# Patient Record
Sex: FEMALE | Race: BLACK OR AFRICAN AMERICAN | Employment: FULL TIME | URBAN - METROPOLITAN AREA
[De-identification: names, ages, dates, MRNs, and addresses within clinical notes are randomized per-mention and may not be internally consistent; named-entity substitution may affect disease eponyms.]

---

## 2018-06-22 ENCOUNTER — HOSPITAL ENCOUNTER (OUTPATIENT)
Dept: PREADMISSION TESTING | Age: 33
Discharge: HOME | End: 2018-06-22
Payer: COMMERCIAL

## 2018-06-22 VITALS
BODY MASS INDEX: 26.95 KG/M2 | DIASTOLIC BLOOD PRESSURE: 82 MMHG | HEART RATE: 80 BPM | WEIGHT: 188.25 LBS | SYSTOLIC BLOOD PRESSURE: 124 MMHG | TEMPERATURE: 98 F | RESPIRATION RATE: 20 BRPM | HEIGHT: 70 IN

## 2018-06-22 LAB
BASOPHILS # BLD: 0 K/UL (ref 0–0.1)
BASOPHILS NFR BLD: 0 % (ref 0–1)
DIFFERENTIAL METHOD BLD: ABNORMAL
EOSINOPHIL # BLD: 0 K/UL (ref 0–0.4)
EOSINOPHIL NFR BLD: 0 % (ref 0–7)
ERYTHROCYTE [DISTWIDTH] IN BLOOD BY AUTOMATED COUNT: 17.3 % (ref 11.5–14.5)
HCG UR QL: NEGATIVE
HCT VFR BLD AUTO: 34.2 % (ref 35–47)
HGB BLD-MCNC: 10.5 G/DL (ref 11.5–16)
IMM GRANULOCYTES # BLD: 0 K/UL (ref 0–0.04)
IMM GRANULOCYTES NFR BLD AUTO: 0 % (ref 0–0.5)
LYMPHOCYTES # BLD: 0.9 K/UL (ref 0.8–3.5)
LYMPHOCYTES NFR BLD: 25 % (ref 12–49)
MCH RBC QN AUTO: 27.4 PG (ref 26–34)
MCHC RBC AUTO-ENTMCNC: 30.7 G/DL (ref 30–36.5)
MCV RBC AUTO: 89.3 FL (ref 80–99)
MONOCYTES # BLD: 0.4 K/UL (ref 0–1)
MONOCYTES NFR BLD: 13 % (ref 5–13)
NEUTS SEG # BLD: 2.2 K/UL (ref 1.8–8)
NEUTS SEG NFR BLD: 62 % (ref 32–75)
NRBC # BLD: 0 K/UL (ref 0–0.01)
NRBC BLD-RTO: 0 PER 100 WBC
PLATELET # BLD AUTO: 236 K/UL (ref 150–400)
PMV BLD AUTO: 12.5 FL (ref 8.9–12.9)
RBC # BLD AUTO: 3.83 M/UL (ref 3.8–5.2)
WBC # BLD AUTO: 3.5 K/UL (ref 3.6–11)

## 2018-06-22 PROCEDURE — 86900 BLOOD TYPING SEROLOGIC ABO: CPT | Performed by: OBSTETRICS & GYNECOLOGY

## 2018-06-22 PROCEDURE — 36415 COLL VENOUS BLD VENIPUNCTURE: CPT | Performed by: OBSTETRICS & GYNECOLOGY

## 2018-06-22 PROCEDURE — 86923 COMPATIBILITY TEST ELECTRIC: CPT | Performed by: OBSTETRICS & GYNECOLOGY

## 2018-06-22 PROCEDURE — 81025 URINE PREGNANCY TEST: CPT | Performed by: OBSTETRICS & GYNECOLOGY

## 2018-06-22 PROCEDURE — 85025 COMPLETE CBC W/AUTO DIFF WBC: CPT | Performed by: OBSTETRICS & GYNECOLOGY

## 2018-06-22 RX ORDER — NORETHINDRONE ACETATE AND ETHINYL ESTRADIOL 1; .02 MG/1; MG/1
TABLET ORAL DAILY
COMMUNITY
End: 2018-07-03

## 2018-06-22 RX ORDER — LANOLIN ALCOHOL/MO/W.PET/CERES
CREAM (GRAM) TOPICAL DAILY
COMMUNITY

## 2018-06-22 RX ORDER — IBUPROFEN 200 MG
800 TABLET ORAL AS NEEDED
COMMUNITY
End: 2018-06-30

## 2018-06-22 RX ORDER — BISMUTH SUBSALICYLATE 262 MG
1 TABLET,CHEWABLE ORAL
COMMUNITY

## 2018-06-27 ENCOUNTER — ANESTHESIA EVENT (OUTPATIENT)
Dept: SURGERY | Age: 33
DRG: 743 | End: 2018-06-27
Payer: COMMERCIAL

## 2018-06-27 NOTE — H&P
Visit Type:  Pre-Op    CC:  Fibroids. History of Present Illness:  27 yo presents for a pre op visit for a Abdominal myomectomy on 18. Pt is currently on her OCP she is planning to DC today. She is taking her Fe still. Vital Signs   26Years Old Female  Height:  71 inches  Weight: 188. 2 pounds  BMI:      26.34  BSA:      2.06  BP:       110/80    Past Pregnancy History   : 0  Para:     0  Aborta:  0  Term: 0, Premature: 0, Living Children: 0, Vaginal Deliveries: 0, C-Sections: 0, Elect. Ab: 0, Ectopics: 0    Gynecologic History   Last Menstrual Period: 06/10/2018  Does patient have any problems with urine leakage? no  Does patient have any other bladder problems? no  History of abnormal pap: no  Gardasil Injection History: Complete  Pt currently sexually active: no  Pt ever sexually active: yes  Current Contraception: Oral Contraception. Junel FE  History of STD: yes   STD Type: HPV.       Allergies    LATEX (DISPOSABLE GLOVES) (Mild)      Medications Removed from Medication List          Past Medical History:     Reviewed history from 2018 and no changes required:        Fibroids -     Past Surgical History:     Reviewed history from 2018 and no changes required:        [facility] Teeth    Family History Summary:      Reviewed history Last on 2018 and no changes required:2018  Uncle - Has Family History of Diabetes - Entered On: 3/26/2018  Uncle - Has Family History of Heart Disease - Entered On: 3/26/2018  Uncle - Has Family History of Hypertension - Entered On: 3/26/2018  MGM - Has Family History of Diabetes - Entered On: 3/26/2018    General Comments - FH:  and dementia    Social History:     Reviewed history from 2018 and no changes required:        [address]        Works for [employer]      Risk Factors:     Smoked Tobacco Use:  Never smoker  Smokeless Tobacco Use:  Never  Drug use:  yes     Substance:  marijuana  HIV high-risk behavior:  no  Caffeine use:  0 drinks per day  Alcohol use:  yes     Type:  socially     Drinks per day:  <1  Exercise:  yes     Times per week:  3-4     Type of Exercise:  cardio, strength training, yoga  Seatbelt use:  100 %  Sun Exposure:  frequently    Family History Risk Factors:     Family History of MI in females < 72years old:  no     Family History of MI in males < 54years old:  no    Previous Tobacco Use: Signed On 2018  Smoked Tobacco Use:  Never smoker  Smokeless Tobacco Use:  Never  Drug use:  yes     Substance:  marijuana  HIV high-risk behavior:  no  Caffeine use:  0 drinks per day    Previous Alcohol Use: Signed On 2018  Alcohol use:  yes     Type:  socially     Drinks per day:  <1  Exercise:  yes     Times per week:  3-4     Type of Exercise:  cardio, strength training, yoga  Seatbelt use:  100 %  Sun Exposure:  frequently    Family History Risk Factors:     Family History of MI in females < 72years old:  no     Family History of MI in males < 54years old:  no      Past Pregnancy History      :  0     Term Births:  0     Premature Births: 0     Living Children: 0     Para:   0     Prev : 0     Aborta:  0     Elect. Ab:  0     Spont. Ab:  0     Ectopics:  0      Review of Systems        See HPI    Except as noted in the HPI, the review of systems is negative for General, Breast, , Resp, GI, Endo, MS, Psych and Heme. General Medical Physical Exam:     General Appearance:       well developed, well nourished, in no acute distress    Head:        Inspection:  normocephalic without obvious abnormalities    Eyes:        External:  EOM intact    Ears, Nose, Throat:        External:  normocephalic and atraumatic       Hearing:  grossly intact    Respiratory:        Resp. effort:  no use of accessory muscles    Cardiovascular:       Peripheral circ: no cyanosis, clubbing, or edema    Gastrointestinal:        Abdomen:  Fibroid uterus palpable above umbilicus       Liver/spleen:   no enlargement or nodularity       Hernia:  no hernias    Genitourinary:        Ext. genitalia: normal appearance; no lesions or discharge       Urethra:  no discharge       Bladder:  no cystocele       Vagina:  distorted due to presence of large fibroid uterus       Cervix:  displaced anteriorly due to large fibroids       Uterus:  20-24 week size       Adnexa:  no masses or tenderness    Musculoskeletal:        Gait/station:  normal gait    Lymphatic:        Axilla:  no axillary adenopathy       Inguinal:  no inguinal adenopathy    Skin:        Inspection:  no rashes, suspicious lesions, or ulcerations    Neurological:        Other:  grossly intact    Psychiatric:       Orientation:  oriented to time, place, and person              Impression & Recommendations:    Problem # 1:  Leiomyoma (ICD-218.9) (YGA63-Q29.9)  Reviewed plans for open abdominal myomectomy. Procedure reviewed with patient. Reviewed 1-2 days in hospital postop. Reviewed plans for probable Pfannenstiel incision but possibility of a vertical skin incision. Reviewed risks of any operation  including but not limited to infection, injury to other organs including but not limited to bladder and bowel, blood loss and potential need for transfusion, DVT/PE and postoperative incisional complications including wound separation. Also reviewed were specific complications regarding myomectomies including possible entry into the uterine cavity, scarring, damage to fallopian tubes, uture infertility as well as the possible need for a hysterectomy. All patient questions were answered, she agrees with plan of care and Krysten/Tammie Godinez consent was signed.     Orders:  Pre-Op Exam (CPT-PO)  Pre-Op Exam (CPT-PO)      Medications (at conclusion of this visit)    06/22/2018 IRON TABLET (FERROUS GLUCONATE TABS) Prescribed by non 606/Tammie Godinez MD  03/26/2018 JUNEL FE 24 1-20 MG-MCG(24) ORAL TABLET (Maranda Deal ESTRAD-FE) Prescribed by sg Bashir/Tammie Godinez MD          ________________________________________________________________________      Date of Surgery Update:  Jb Gottlieb was seen and examined. History and physical has been reviewed. The patient has been examined.  There have been no significant clinical changes since the completion of the originally dated History and Physical.    Signed By: Jordana Santo MD     June 28, 2018 7:29 AM

## 2018-06-28 ENCOUNTER — ANESTHESIA (OUTPATIENT)
Dept: SURGERY | Age: 33
DRG: 743 | End: 2018-06-28
Payer: COMMERCIAL

## 2018-06-28 ENCOUNTER — HOSPITAL ENCOUNTER (INPATIENT)
Age: 33
LOS: 2 days | Discharge: HOME | DRG: 743 | End: 2018-06-30
Attending: OBSTETRICS & GYNECOLOGY | Admitting: OBSTETRICS & GYNECOLOGY
Payer: COMMERCIAL

## 2018-06-28 DIAGNOSIS — D25.2 INTRAMURAL AND SUBSEROUS LEIOMYOMA OF UTERUS: Primary | ICD-10-CM

## 2018-06-28 DIAGNOSIS — D25.1 INTRAMURAL AND SUBSEROUS LEIOMYOMA OF UTERUS: Primary | ICD-10-CM

## 2018-06-28 PROBLEM — D21.9 FIBROID: Status: ACTIVE | Noted: 2018-06-28

## 2018-06-28 LAB
ERYTHROCYTE [DISTWIDTH] IN BLOOD BY AUTOMATED COUNT: 17.2 % (ref 11.5–14.5)
HCG UR QL: NEGATIVE
HCT VFR BLD AUTO: 28.6 % (ref 35–47)
HGB BLD-MCNC: 9.1 G/DL (ref 11.5–16)
MCH RBC QN AUTO: 28.7 PG (ref 26–34)
MCHC RBC AUTO-ENTMCNC: 31.8 G/DL (ref 30–36.5)
MCV RBC AUTO: 90.2 FL (ref 80–99)
NRBC # BLD: 0 K/UL (ref 0–0.01)
NRBC BLD-RTO: 0 PER 100 WBC
PLATELET # BLD AUTO: 177 K/UL (ref 150–400)
PMV BLD AUTO: 11.5 FL (ref 8.9–12.9)
RBC # BLD AUTO: 3.17 M/UL (ref 3.8–5.2)
WBC # BLD AUTO: 15.6 K/UL (ref 3.6–11)

## 2018-06-28 PROCEDURE — 77030018836 HC SOL IRR NACL ICUM -A: Performed by: OBSTETRICS & GYNECOLOGY

## 2018-06-28 PROCEDURE — 88305 TISSUE EXAM BY PATHOLOGIST: CPT | Performed by: OBSTETRICS & GYNECOLOGY

## 2018-06-28 PROCEDURE — 85027 COMPLETE CBC AUTOMATED: CPT | Performed by: OBSTETRICS & GYNECOLOGY

## 2018-06-28 PROCEDURE — 77030018390 HC SPNG HEMSTAT2 J&J -B: Performed by: OBSTETRICS & GYNECOLOGY

## 2018-06-28 PROCEDURE — 74011250636 HC RX REV CODE- 250/636: Performed by: ANESTHESIOLOGY

## 2018-06-28 PROCEDURE — 77030002933 HC SUT MCRYL J&J -A: Performed by: OBSTETRICS & GYNECOLOGY

## 2018-06-28 PROCEDURE — 74011250636 HC RX REV CODE- 250/636

## 2018-06-28 PROCEDURE — 76010000137 HC OR TIME 5 TO 5.5 HR: Performed by: OBSTETRICS & GYNECOLOGY

## 2018-06-28 PROCEDURE — 0UB90ZZ EXCISION OF UTERUS, OPEN APPROACH: ICD-10-PCS | Performed by: OBSTETRICS & GYNECOLOGY

## 2018-06-28 PROCEDURE — 77030026438 HC STYL ET INTUB CARD -A: Performed by: ANESTHESIOLOGY

## 2018-06-28 PROCEDURE — 77030013079 HC BLNKT BAIR HGGR 3M -A: Performed by: ANESTHESIOLOGY

## 2018-06-28 PROCEDURE — 77030039266 HC ADH SKN EXOFIN S2SG -A: Performed by: OBSTETRICS & GYNECOLOGY

## 2018-06-28 PROCEDURE — P9016 RBC LEUKOCYTES REDUCED: HCPCS | Performed by: OBSTETRICS & GYNECOLOGY

## 2018-06-28 PROCEDURE — 85018 HEMOGLOBIN: CPT

## 2018-06-28 PROCEDURE — 74011250636 HC RX REV CODE- 250/636: Performed by: OBSTETRICS & GYNECOLOGY

## 2018-06-28 PROCEDURE — 77030031139 HC SUT VCRL2 J&J -A: Performed by: OBSTETRICS & GYNECOLOGY

## 2018-06-28 PROCEDURE — 76210000017 HC OR PH I REC 1.5 TO 2 HR: Performed by: OBSTETRICS & GYNECOLOGY

## 2018-06-28 PROCEDURE — 77030005518 HC CATH URETH FOL 2W BARD -B: Performed by: OBSTETRICS & GYNECOLOGY

## 2018-06-28 PROCEDURE — 77030011640 HC PAD GRND REM COVD -A: Performed by: OBSTETRICS & GYNECOLOGY

## 2018-06-28 PROCEDURE — 81025 URINE PREGNANCY TEST: CPT

## 2018-06-28 PROCEDURE — 74011000250 HC RX REV CODE- 250

## 2018-06-28 PROCEDURE — 74011000258 HC RX REV CODE- 258

## 2018-06-28 PROCEDURE — 74011000258 HC RX REV CODE- 258: Performed by: OBSTETRICS & GYNECOLOGY

## 2018-06-28 PROCEDURE — 77030008684 HC TU ET CUF COVD -B: Performed by: ANESTHESIOLOGY

## 2018-06-28 PROCEDURE — C1765 ADHESION BARRIER: HCPCS | Performed by: OBSTETRICS & GYNECOLOGY

## 2018-06-28 PROCEDURE — 36415 COLL VENOUS BLD VENIPUNCTURE: CPT | Performed by: OBSTETRICS & GYNECOLOGY

## 2018-06-28 PROCEDURE — 77030018846 HC SOL IRR STRL H20 ICUM -A: Performed by: OBSTETRICS & GYNECOLOGY

## 2018-06-28 PROCEDURE — 76060000041 HC ANESTHESIA 5 TO 5.5 HR: Performed by: OBSTETRICS & GYNECOLOGY

## 2018-06-28 PROCEDURE — 65410000002 HC RM PRIVATE OB

## 2018-06-28 PROCEDURE — P9045 ALBUMIN (HUMAN), 5%, 250 ML: HCPCS

## 2018-06-28 PROCEDURE — 77030032490 HC SLV COMPR SCD KNE COVD -B: Performed by: OBSTETRICS & GYNECOLOGY

## 2018-06-28 PROCEDURE — 74011000250 HC RX REV CODE- 250: Performed by: NURSE ANESTHETIST, CERTIFIED REGISTERED

## 2018-06-28 RX ORDER — ONDANSETRON 2 MG/ML
4 INJECTION INTRAMUSCULAR; INTRAVENOUS
Status: DISCONTINUED | OUTPATIENT
Start: 2018-06-28 | End: 2018-06-30 | Stop reason: HOSPADM

## 2018-06-28 RX ORDER — ONDANSETRON 2 MG/ML
4 INJECTION INTRAMUSCULAR; INTRAVENOUS AS NEEDED
Status: DISCONTINUED | OUTPATIENT
Start: 2018-06-28 | End: 2018-06-28 | Stop reason: HOSPADM

## 2018-06-28 RX ORDER — SODIUM CHLORIDE 0.9 % (FLUSH) 0.9 %
5-10 SYRINGE (ML) INJECTION EVERY 8 HOURS
Status: DISCONTINUED | OUTPATIENT
Start: 2018-06-28 | End: 2018-06-28 | Stop reason: HOSPADM

## 2018-06-28 RX ORDER — SODIUM CHLORIDE 0.9 % (FLUSH) 0.9 %
5-10 SYRINGE (ML) INJECTION EVERY 8 HOURS
Status: DISCONTINUED | OUTPATIENT
Start: 2018-06-28 | End: 2018-06-30 | Stop reason: HOSPADM

## 2018-06-28 RX ORDER — MIDAZOLAM HYDROCHLORIDE 1 MG/ML
1 INJECTION, SOLUTION INTRAMUSCULAR; INTRAVENOUS AS NEEDED
Status: DISCONTINUED | OUTPATIENT
Start: 2018-06-28 | End: 2018-06-28 | Stop reason: HOSPADM

## 2018-06-28 RX ORDER — SODIUM CHLORIDE 9 MG/ML
250 INJECTION, SOLUTION INTRAVENOUS AS NEEDED
Status: DISCONTINUED | OUTPATIENT
Start: 2018-06-28 | End: 2018-06-29

## 2018-06-28 RX ORDER — SODIUM CHLORIDE 9 MG/ML
INJECTION, SOLUTION INTRAVENOUS
Status: DISCONTINUED | OUTPATIENT
Start: 2018-06-28 | End: 2018-06-28 | Stop reason: HOSPADM

## 2018-06-28 RX ORDER — FENTANYL CITRATE 50 UG/ML
25 INJECTION, SOLUTION INTRAMUSCULAR; INTRAVENOUS
Status: COMPLETED | OUTPATIENT
Start: 2018-06-28 | End: 2018-06-28

## 2018-06-28 RX ORDER — ONDANSETRON 2 MG/ML
INJECTION INTRAMUSCULAR; INTRAVENOUS AS NEEDED
Status: DISCONTINUED | OUTPATIENT
Start: 2018-06-28 | End: 2018-06-28 | Stop reason: HOSPADM

## 2018-06-28 RX ORDER — MORPHINE SULFATE 10 MG/ML
2 INJECTION, SOLUTION INTRAMUSCULAR; INTRAVENOUS
Status: DISCONTINUED | OUTPATIENT
Start: 2018-06-28 | End: 2018-06-28 | Stop reason: HOSPADM

## 2018-06-28 RX ORDER — SODIUM CHLORIDE 9 MG/ML
1000 INJECTION, SOLUTION INTRAVENOUS CONTINUOUS
Status: DISCONTINUED | OUTPATIENT
Start: 2018-06-28 | End: 2018-06-28 | Stop reason: HOSPADM

## 2018-06-28 RX ORDER — CEFAZOLIN SODIUM/WATER 2 G/20 ML
2 SYRINGE (ML) INTRAVENOUS
Status: COMPLETED | OUTPATIENT
Start: 2018-06-28 | End: 2018-06-28

## 2018-06-28 RX ORDER — OXYCODONE AND ACETAMINOPHEN 5; 325 MG/1; MG/1
1 TABLET ORAL AS NEEDED
Status: DISCONTINUED | OUTPATIENT
Start: 2018-06-28 | End: 2018-06-28 | Stop reason: HOSPADM

## 2018-06-28 RX ORDER — PROPOFOL 10 MG/ML
INJECTION, EMULSION INTRAVENOUS AS NEEDED
Status: DISCONTINUED | OUTPATIENT
Start: 2018-06-28 | End: 2018-06-28 | Stop reason: HOSPADM

## 2018-06-28 RX ORDER — DOCUSATE SODIUM 100 MG/1
100 CAPSULE, LIQUID FILLED ORAL
Status: DISCONTINUED | OUTPATIENT
Start: 2018-06-28 | End: 2018-06-30 | Stop reason: HOSPADM

## 2018-06-28 RX ORDER — DEXAMETHASONE SODIUM PHOSPHATE 4 MG/ML
INJECTION, SOLUTION INTRA-ARTICULAR; INTRALESIONAL; INTRAMUSCULAR; INTRAVENOUS; SOFT TISSUE AS NEEDED
Status: DISCONTINUED | OUTPATIENT
Start: 2018-06-28 | End: 2018-06-28 | Stop reason: HOSPADM

## 2018-06-28 RX ORDER — MIDAZOLAM HYDROCHLORIDE 1 MG/ML
0.5 INJECTION, SOLUTION INTRAMUSCULAR; INTRAVENOUS
Status: DISCONTINUED | OUTPATIENT
Start: 2018-06-28 | End: 2018-06-28 | Stop reason: HOSPADM

## 2018-06-28 RX ORDER — SODIUM CHLORIDE 0.9 % (FLUSH) 0.9 %
5-10 SYRINGE (ML) INJECTION AS NEEDED
Status: DISCONTINUED | OUTPATIENT
Start: 2018-06-28 | End: 2018-06-28 | Stop reason: HOSPADM

## 2018-06-28 RX ORDER — SUCCINYLCHOLINE CHLORIDE 20 MG/ML
INJECTION INTRAMUSCULAR; INTRAVENOUS AS NEEDED
Status: DISCONTINUED | OUTPATIENT
Start: 2018-06-28 | End: 2018-06-28 | Stop reason: HOSPADM

## 2018-06-28 RX ORDER — HYDROMORPHONE HYDROCHLORIDE 2 MG/ML
INJECTION, SOLUTION INTRAMUSCULAR; INTRAVENOUS; SUBCUTANEOUS AS NEEDED
Status: DISCONTINUED | OUTPATIENT
Start: 2018-06-28 | End: 2018-06-28 | Stop reason: HOSPADM

## 2018-06-28 RX ORDER — SODIUM CHLORIDE, SODIUM LACTATE, POTASSIUM CHLORIDE, CALCIUM CHLORIDE 600; 310; 30; 20 MG/100ML; MG/100ML; MG/100ML; MG/100ML
125 INJECTION, SOLUTION INTRAVENOUS CONTINUOUS
Status: DISCONTINUED | OUTPATIENT
Start: 2018-06-28 | End: 2018-06-28 | Stop reason: HOSPADM

## 2018-06-28 RX ORDER — FENTANYL CITRATE 50 UG/ML
50 INJECTION, SOLUTION INTRAMUSCULAR; INTRAVENOUS AS NEEDED
Status: DISCONTINUED | OUTPATIENT
Start: 2018-06-28 | End: 2018-06-28 | Stop reason: HOSPADM

## 2018-06-28 RX ORDER — KETOROLAC TROMETHAMINE 30 MG/ML
30 INJECTION, SOLUTION INTRAMUSCULAR; INTRAVENOUS
Status: DISCONTINUED | OUTPATIENT
Start: 2018-06-28 | End: 2018-06-29

## 2018-06-28 RX ORDER — LIDOCAINE HYDROCHLORIDE 20 MG/ML
INJECTION, SOLUTION EPIDURAL; INFILTRATION; INTRACAUDAL; PERINEURAL AS NEEDED
Status: DISCONTINUED | OUTPATIENT
Start: 2018-06-28 | End: 2018-06-28 | Stop reason: HOSPADM

## 2018-06-28 RX ORDER — FENTANYL CITRATE 50 UG/ML
INJECTION, SOLUTION INTRAMUSCULAR; INTRAVENOUS
Status: COMPLETED
Start: 2018-06-28 | End: 2018-06-28

## 2018-06-28 RX ORDER — ROCURONIUM BROMIDE 10 MG/ML
INJECTION, SOLUTION INTRAVENOUS AS NEEDED
Status: DISCONTINUED | OUTPATIENT
Start: 2018-06-28 | End: 2018-06-28 | Stop reason: HOSPADM

## 2018-06-28 RX ORDER — FENTANYL CITRATE 50 UG/ML
INJECTION, SOLUTION INTRAMUSCULAR; INTRAVENOUS AS NEEDED
Status: DISCONTINUED | OUTPATIENT
Start: 2018-06-28 | End: 2018-06-28 | Stop reason: HOSPADM

## 2018-06-28 RX ORDER — LIDOCAINE HYDROCHLORIDE 10 MG/ML
0.1 INJECTION, SOLUTION EPIDURAL; INFILTRATION; INTRACAUDAL; PERINEURAL AS NEEDED
Status: DISCONTINUED | OUTPATIENT
Start: 2018-06-28 | End: 2018-06-28 | Stop reason: HOSPADM

## 2018-06-28 RX ORDER — SODIUM CHLORIDE 9 MG/ML
50 INJECTION, SOLUTION INTRAVENOUS CONTINUOUS
Status: DISCONTINUED | OUTPATIENT
Start: 2018-06-28 | End: 2018-06-29

## 2018-06-28 RX ORDER — DIPHENHYDRAMINE HYDROCHLORIDE 50 MG/ML
12.5 INJECTION, SOLUTION INTRAMUSCULAR; INTRAVENOUS AS NEEDED
Status: DISCONTINUED | OUTPATIENT
Start: 2018-06-28 | End: 2018-06-28 | Stop reason: HOSPADM

## 2018-06-28 RX ORDER — MIDAZOLAM HYDROCHLORIDE 1 MG/ML
INJECTION, SOLUTION INTRAMUSCULAR; INTRAVENOUS AS NEEDED
Status: DISCONTINUED | OUTPATIENT
Start: 2018-06-28 | End: 2018-06-28 | Stop reason: HOSPADM

## 2018-06-28 RX ORDER — SODIUM CHLORIDE 0.9 % (FLUSH) 0.9 %
5-10 SYRINGE (ML) INJECTION AS NEEDED
Status: DISCONTINUED | OUTPATIENT
Start: 2018-06-28 | End: 2018-06-30 | Stop reason: HOSPADM

## 2018-06-28 RX ORDER — DIPHENHYDRAMINE HYDROCHLORIDE 50 MG/ML
12.5 INJECTION, SOLUTION INTRAMUSCULAR; INTRAVENOUS
Status: DISCONTINUED | OUTPATIENT
Start: 2018-06-28 | End: 2018-06-30 | Stop reason: HOSPADM

## 2018-06-28 RX ORDER — PHENYLEPHRINE HCL IN 0.9% NACL 0.4MG/10ML
SYRINGE (ML) INTRAVENOUS AS NEEDED
Status: DISCONTINUED | OUTPATIENT
Start: 2018-06-28 | End: 2018-06-28 | Stop reason: HOSPADM

## 2018-06-28 RX ORDER — HYDROMORPHONE HCL/0.9% NACL/PF 0.5 MG/ML
PLASTIC BAG, INJECTION (ML) INTRAVENOUS CONTINUOUS
Status: DISCONTINUED | OUTPATIENT
Start: 2018-06-28 | End: 2018-06-29

## 2018-06-28 RX ORDER — ALBUMIN HUMAN 50 G/1000ML
SOLUTION INTRAVENOUS AS NEEDED
Status: DISCONTINUED | OUTPATIENT
Start: 2018-06-28 | End: 2018-06-28 | Stop reason: HOSPADM

## 2018-06-28 RX ADMIN — Medication 2 G: at 11:44

## 2018-06-28 RX ADMIN — MORPHINE SULFATE 2 MG: 10 INJECTION INTRAVENOUS at 13:33

## 2018-06-28 RX ADMIN — MORPHINE SULFATE 2 MG: 10 INJECTION INTRAVENOUS at 13:55

## 2018-06-28 RX ADMIN — MORPHINE SULFATE 2 MG: 10 INJECTION INTRAVENOUS at 13:38

## 2018-06-28 RX ADMIN — HYDROMORPHONE HYDROCHLORIDE 0.5 MG: 2 INJECTION, SOLUTION INTRAMUSCULAR; INTRAVENOUS; SUBCUTANEOUS at 10:18

## 2018-06-28 RX ADMIN — FENTANYL CITRATE 100 MCG: 50 INJECTION, SOLUTION INTRAMUSCULAR; INTRAVENOUS at 07:39

## 2018-06-28 RX ADMIN — ROCURONIUM BROMIDE 20 MG: 10 INJECTION, SOLUTION INTRAVENOUS at 08:10

## 2018-06-28 RX ADMIN — MIDAZOLAM 0.5 MG: 1 INJECTION INTRAMUSCULAR; INTRAVENOUS at 13:30

## 2018-06-28 RX ADMIN — FENTANYL CITRATE 25 MCG: 50 INJECTION, SOLUTION INTRAMUSCULAR; INTRAVENOUS at 13:16

## 2018-06-28 RX ADMIN — MORPHINE SULFATE 2 MG: 10 INJECTION INTRAVENOUS at 13:50

## 2018-06-28 RX ADMIN — ROCURONIUM BROMIDE 10 MG: 10 INJECTION, SOLUTION INTRAVENOUS at 08:36

## 2018-06-28 RX ADMIN — MORPHINE SULFATE 2 MG: 10 INJECTION INTRAVENOUS at 14:25

## 2018-06-28 RX ADMIN — Medication: at 23:59

## 2018-06-28 RX ADMIN — SODIUM CHLORIDE: 9 INJECTION, SOLUTION INTRAVENOUS at 11:15

## 2018-06-28 RX ADMIN — ROCURONIUM BROMIDE 5 MG: 10 INJECTION, SOLUTION INTRAVENOUS at 07:39

## 2018-06-28 RX ADMIN — ONDANSETRON 4 MG: 2 INJECTION INTRAMUSCULAR; INTRAVENOUS at 12:24

## 2018-06-28 RX ADMIN — HYDROMORPHONE HYDROCHLORIDE 0.5 MG: 2 INJECTION, SOLUTION INTRAMUSCULAR; INTRAVENOUS; SUBCUTANEOUS at 10:50

## 2018-06-28 RX ADMIN — Medication 80 MCG: at 11:40

## 2018-06-28 RX ADMIN — FENTANYL CITRATE 50 MCG: 50 INJECTION, SOLUTION INTRAMUSCULAR; INTRAVENOUS at 09:42

## 2018-06-28 RX ADMIN — ROCURONIUM BROMIDE 30 MG: 10 INJECTION, SOLUTION INTRAVENOUS at 11:44

## 2018-06-28 RX ADMIN — Medication 80 MCG: at 11:24

## 2018-06-28 RX ADMIN — SUGAMMADEX 300 MG: 100 INJECTION, SOLUTION INTRAVENOUS at 12:24

## 2018-06-28 RX ADMIN — ALBUMIN HUMAN 250 ML: 50 SOLUTION INTRAVENOUS at 09:10

## 2018-06-28 RX ADMIN — DEXAMETHASONE SODIUM PHOSPHATE 8 MG: 4 INJECTION, SOLUTION INTRA-ARTICULAR; INTRALESIONAL; INTRAMUSCULAR; INTRAVENOUS; SOFT TISSUE at 07:44

## 2018-06-28 RX ADMIN — SODIUM CHLORIDE, POTASSIUM CHLORIDE, SODIUM LACTATE AND CALCIUM CHLORIDE: 600; 310; 30; 20 INJECTION, SOLUTION INTRAVENOUS at 10:55

## 2018-06-28 RX ADMIN — ROCURONIUM BROMIDE 20 MG: 10 INJECTION, SOLUTION INTRAVENOUS at 11:17

## 2018-06-28 RX ADMIN — HYDROMORPHONE HYDROCHLORIDE 0.5 MG: 2 INJECTION, SOLUTION INTRAMUSCULAR; INTRAVENOUS; SUBCUTANEOUS at 08:01

## 2018-06-28 RX ADMIN — FENTANYL CITRATE 25 MCG: 50 INJECTION, SOLUTION INTRAMUSCULAR; INTRAVENOUS at 13:01

## 2018-06-28 RX ADMIN — Medication 80 MCG: at 10:48

## 2018-06-28 RX ADMIN — Medication 80 MCG: at 11:09

## 2018-06-28 RX ADMIN — ALBUMIN HUMAN 250 ML: 50 SOLUTION INTRAVENOUS at 09:56

## 2018-06-28 RX ADMIN — MORPHINE SULFATE 2 MG: 10 INJECTION INTRAVENOUS at 14:00

## 2018-06-28 RX ADMIN — ROCURONIUM BROMIDE 10 MG: 10 INJECTION, SOLUTION INTRAVENOUS at 08:12

## 2018-06-28 RX ADMIN — PROPOFOL 30 MG: 10 INJECTION, EMULSION INTRAVENOUS at 07:58

## 2018-06-28 RX ADMIN — FENTANYL CITRATE 25 MCG: 50 INJECTION, SOLUTION INTRAMUSCULAR; INTRAVENOUS at 13:06

## 2018-06-28 RX ADMIN — Medication: at 19:46

## 2018-06-28 RX ADMIN — SUCCINYLCHOLINE CHLORIDE 100 MG: 20 INJECTION INTRAMUSCULAR; INTRAVENOUS at 07:39

## 2018-06-28 RX ADMIN — Medication 2 G: at 07:44

## 2018-06-28 RX ADMIN — MIDAZOLAM HYDROCHLORIDE 2 MG: 1 INJECTION, SOLUTION INTRAMUSCULAR; INTRAVENOUS at 07:33

## 2018-06-28 RX ADMIN — MORPHINE SULFATE 2 MG: 10 INJECTION INTRAVENOUS at 13:28

## 2018-06-28 RX ADMIN — MORPHINE SULFATE 2 MG: 10 INJECTION INTRAVENOUS at 14:20

## 2018-06-28 RX ADMIN — LIDOCAINE HYDROCHLORIDE 50 MG: 20 INJECTION, SOLUTION EPIDURAL; INFILTRATION; INTRACAUDAL; PERINEURAL at 07:39

## 2018-06-28 RX ADMIN — FENTANYL CITRATE 25 MCG: 50 INJECTION, SOLUTION INTRAMUSCULAR; INTRAVENOUS at 13:11

## 2018-06-28 RX ADMIN — ROCURONIUM BROMIDE 10 MG: 10 INJECTION, SOLUTION INTRAVENOUS at 09:06

## 2018-06-28 RX ADMIN — SODIUM CHLORIDE, POTASSIUM CHLORIDE, SODIUM LACTATE AND CALCIUM CHLORIDE: 600; 310; 30; 20 INJECTION, SOLUTION INTRAVENOUS at 07:24

## 2018-06-28 RX ADMIN — MORPHINE SULFATE 2 MG: 10 INJECTION INTRAVENOUS at 14:10

## 2018-06-28 RX ADMIN — SODIUM CHLORIDE 1000 ML: 900 INJECTION, SOLUTION INTRAVENOUS at 14:04

## 2018-06-28 RX ADMIN — SODIUM CHLORIDE, POTASSIUM CHLORIDE, SODIUM LACTATE AND CALCIUM CHLORIDE: 600; 310; 30; 20 INJECTION, SOLUTION INTRAVENOUS at 08:17

## 2018-06-28 RX ADMIN — ROCURONIUM BROMIDE 20 MG: 10 INJECTION, SOLUTION INTRAVENOUS at 09:29

## 2018-06-28 RX ADMIN — Medication: at 14:04

## 2018-06-28 RX ADMIN — PROPOFOL 170 MG: 10 INJECTION, EMULSION INTRAVENOUS at 07:39

## 2018-06-28 RX ADMIN — MORPHINE SULFATE 2 MG: 10 INJECTION INTRAVENOUS at 14:15

## 2018-06-28 RX ADMIN — FENTANYL CITRATE 50 MCG: 50 INJECTION, SOLUTION INTRAMUSCULAR; INTRAVENOUS at 08:25

## 2018-06-28 RX ADMIN — Medication 80 MCG: at 10:57

## 2018-06-28 RX ADMIN — Medication 10 ML: at 20:53

## 2018-06-28 RX ADMIN — ONDANSETRON 4 MG: 2 INJECTION INTRAMUSCULAR; INTRAVENOUS at 20:53

## 2018-06-28 RX ADMIN — HYDROMORPHONE HYDROCHLORIDE 0.5 MG: 2 INJECTION, SOLUTION INTRAMUSCULAR; INTRAVENOUS; SUBCUTANEOUS at 09:51

## 2018-06-28 NOTE — IP AVS SNAPSHOT
Summary of Care Report The Summary of Care report has been created to help improve care coordination. Users with access to [employer] or [address] (Web-based application) may access additional patient information including the Discharge Summary. If you are not currently a [address] user and need more information, please call the number listed below in the [place] [address] section and ask to be connected with Medical Records. Facility Information Name Address Phone [address] [id number] [phone] Patient Information Patient Name Sex  Karolina Tellez ([id number]) Female 1985 Discharge Information Admitting Provider Service Area Unit Stephen Cope MD / [phone] [address] [place] [address] Unit / [phone] Discharge Provider Discharge Date/Time Discharge Disposition Destination (none) 2018 (Pending) AHR (none) Hospital Problems as of 2018  Never Reviewed Class Noted - Resolved Last Modified POA Active Problems Anemia  Unknown - Present 2018 by Joe Colindres CRNA Yes Entered by Joe Colindres CRNA Fibroid  2018 - Present 2018 by Stephen Cope MD Unknown Entered by Stephen Cope MD  
  
You are allergic to the following Allergen Reactions Latex Rash Current Discharge Medication List  
  
START taking these medications Dose & Instructions Dispensing Information Comments  
 oxyCODONE-acetaminophen 5-325 mg per tablet Commonly known as:  PERCOCET Dose:  1 Tab Take 1 Tab by mouth every four (4) hours as needed. Max Daily Amount: 6 Tabs. Quantity:  28 Tab Refills:  0 CONTINUE these medications which have CHANGED Dose & Instructions Dispensing Information Comments  
 ibuprofen 600 mg tablet Commonly known as:  MOTRIN What changed:   
- medication strength 
- how much to take - when to take this Dose:  600 mg Take 1 Tab by mouth every six (6) hours as needed for Pain. Quantity:  30 Tab Refills:  1 CONTINUE these medications which have NOT CHANGED Dose & Instructions Dispensing Information Comments Iron 325 mg (65 mg iron) tablet Generic drug:  ferrous sulfate Take  by mouth daily. Refills:  0 JUNEL 1/20 (21) 1-20 mg-mcg Tab Generic drug:  norethindrone-ethinyl estradiol Take  by mouth daily. Refills:  0  
   
 multivitamin tablet Commonly known as:  ONE A DAY Dose:  1 Tab Take 1 Tab by mouth Every Tues, Thur & Sat. Refills:  0 Surgery Information ID Date/Time Status Primary Surgeon All Procedures Location [id number] 6/28/2018 Ed Batista MD ABDOMINAL MYOMECTOMY  [facility] MAIN OR Follow-up Information Follow up With Details Comments Contact Info Avelina Bruce MD   Patient can only remember the practice name and not the physician Discharge Instructions Abdominal Myomectomy: What to Expect at [facility] Your Recovery You can expect to feel better and stronger each day, although you may tire quickly and need pain medicine for a week or two. You may need about 4 to 6 weeks to fully recover. Do not lift anything heavy while you are recovering so that your incision and your belly muscles can heal. 
This care sheet gives you a general idea about how long it will take for you to recover. But each person recovers at a different pace. Follow the steps below to get better as quickly as possible. How can you care for yourself at home? Activity ? · Rest when you feel tired. Getting enough sleep will help you recover. ? · Try to walk each day. Start out by walking a little more than you did the day before. Bit by bit, increase the amount you walk.  Walking boosts blood flow and helps prevent pneumonia and constipation. ? · For 4 to 6 weeks, avoid lifting anything that would make you strain. This may include a child, heavy grocery bags and milk containers, a heavy briefcase or backpack, cat litter or dog food bags, or a vacuum . ? · Avoid strenuous activities, such as biking, jogging, weightlifting, and aerobic exercise, for 4 to 6 weeks. ? · You may shower. Pat the incision dry when you are done. Do not take a bath for the first week after surgery or until your doctor tells you it is okay. ? · You may have some light vaginal bleeding. Wear sanitary pads if needed. Do not douche or use tampons. ? · Ask your doctor when you can drive again. ? · You will probably need to take 2 to 4 weeks off work. It depends on the type of work you do and how you feel. ? · Do not have sex until your doctor tells you it is okay. ? · Talk about birth control with your doctor. Do not try to become pregnant until your doctor says it is okay. Diet ? · You can eat your normal diet. If your stomach is upset, try bland, low-fat foods like plain rice, broiled chicken, toast, and yogurt. ? · Drink plenty of fluids (unless your doctor tells you not to). ? · You may notice that your bowel movements are not regular right after your surgery. This is common. Try to avoid constipation and straining with bowel movements. You may want to take a fiber supplement every day. If you have not had a bowel movement after a couple of days, ask your doctor about taking a mild laxative. Medicines ? · Your doctor will tell you if and when you can restart your medicines. He or she will also give you instructions about taking any new medicines. ? · If you take blood thinners, such as warfarin (Coumadin), clopidogrel (Plavix), or aspirin, be sure to talk to your doctor. He or she will tell you if and when to start taking those medicines again.  Make sure that you understand exactly what your doctor wants you to do. ? · Take pain medicines exactly as directed. ¨ If the doctor gave you a prescription medicine for pain, take it as prescribed. ¨ If you are not taking a prescription pain medicine, take an over-the-counter medicine such as acetaminophen (Tylenol), ibuprofen (Advil, Motrin), or naproxen (Aleve). Read and follow all instructions on the label. Do not take two or more pain medicines at the same time unless the doctor told you to. Many pain medicines contain acetaminophen, which is Tylenol. Too much acetaminophen (Tylenol) can be harmful. ? · If you think your pain medicine is making you sick to your stomach: 
¨ Take your medicine after meals (unless your doctor tells you not to). ¨ Ask your doctor for a different pain medicine. ? · If your doctor prescribed antibiotics, take them as directed. Do not stop taking them just because you feel better. You need to take the full course of antibiotics. Incision care ? · If you have strips of tape on the cut (incision) the doctor made, leave the tape on for a week or until it falls off.  
? · Wash the area daily with warm, soapy water and pat it dry. ? · Keep the area clean and dry. You may cover it with a gauze bandage if it weeps or rubs against clothing. Change the bandage every day. Other instructions ? · Wear loose, comfortable clothing and avoid anything that puts pressure on your belly for a few weeks. Follow-up care is a key part of your treatment and safety. Be sure to make and go to all appointments, and call your doctor if you are having problems. It's also a good idea to know your test results and keep a list of the medicines you take. When should you call for help? Call 911 anytime you think you may need emergency care. For example, call if: 
? · You passed out (lost consciousness). ? · You have chest pain, are short of breath, or cough up blood. ?Call your doctor now or seek immediate medical care if: 
? · You have pain that does not get better after you take pain medicine. ? · You cannot pass stools or gas. ? · You have vaginal discharge that has increased in amount or smells bad.  
? · You are sick to your stomach or cannot drink fluids. ? · You have loose stitches, or your incision comes open. ? · Bright red blood has soaked through the bandage over your incision. ? · You have signs of infection, such as: 
¨ Increased pain, swelling, warmth, or redness. ¨ Red streaks leading from the incision. ¨ Pus draining from the incision. ¨ A fever. ? · You have bright red vaginal bleeding that soaks one or more pads in an hour, or you have large clots. ? · You have signs of a blood clot in your leg (called a deep vein thrombosis), such as: 
¨ Pain in your calf, back of the knee, thigh, or groin. ¨ Redness and swelling in your leg or groin. ? Watch closely for any changes in your health, and be sure to contact your doctor if you have any problems. Where can you learn more? Go to http://kennedy-orly.info/. Enter C100 in the search box to learn more about \"Abdominal Myomectomy: What to Expect at Home. \" Current as of: October 13, 2016 Content Version: 11.4 © 1035-8209 [employer]. Care instructions adapted under license by [employer] (which disclaims liability or warranty for this information). If you have questions about a medical condition or this instruction, always ask your healthcare professional. [place] [address] any warranty or liability for your use of this information. Chart Review Routing History No Routing History on File

## 2018-06-28 NOTE — IP AVS SNAPSHOT
[address] [phone] Patient: Maonj Mckeon MRN: [id number] A check daniel indicates which time of day the medication should be taken. My Medications START taking these medications Instructions Each Dose to Equal  
 Morning Noon Evening Bedtime  
 oxyCODONE-acetaminophen 5-325 mg per tablet Commonly known as:  PERCOCET Your last dose was: Your next dose is: Take 1 Tab by mouth every four (4) hours as needed. Max Daily Amount: 6 Tabs. 1 Tab CHANGE how you take these medications Instructions Each Dose to Equal  
 Morning Noon Evening Bedtime  
 ibuprofen 600 mg tablet Commonly known as:  MOTRIN What changed:   
- medication strength 
- how much to take - when to take this Your last dose was: Your next dose is: Take 1 Tab by mouth every six (6) hours as needed for Pain. 600 mg CONTINUE taking these medications Instructions Each Dose to Equal  
 Morning Noon Evening Bedtime Iron 325 mg (65 mg iron) tablet Generic drug:  ferrous sulfate Your last dose was: Your next dose is: Take  by mouth daily. JUNEL 1/20 (21) 1-20 mg-mcg Tab Generic drug:  norethindrone-ethinyl estradiol Your last dose was: Your next dose is: Take  by mouth daily. multivitamin tablet Commonly known as:  ONE A DAY Your last dose was: Your next dose is: Take 1 Tab by mouth Every Tues, Thur & Sat.  
 1 Tab Where to Get Your Medications Information on where to get these meds will be given to you by the nurse or doctor. ! Ask your nurse or doctor about these medications  
  ibuprofen 600 mg tablet oxyCODONE-acetaminophen 5-325 mg per tablet

## 2018-06-28 NOTE — BRIEF OP NOTE
BRIEF OPERATIVE NOTE    Date of Procedure: 6/28/2018   Preoperative Diagnosis: LEIOMYOMA  Postoperative Diagnosis: LEIOMYOMA    Procedure(s):  ABDOMINAL MYOMECTOMY   Surgeon(s) and Role:     * Jojo Rodas MD - Primary     * Nael Langston MD - Assisting           Surgical Staff:  Circ-1: Wing Hammond  Scrub RN-1: Wanda Bolden RN  Surg Asst-1: Amee Vergara RN  Float Staff: Katya Malone RN  Event Time In   Incision Start 7360   Incision Close 1245     Anesthesia: General   Estimated Blood Loss: 1000mL  Specimens:   ID Type Source Tests Collected by Time Destination   1 : uterine fibroids and serosa Fresh Uterus  Jojo Rodas MD 6/28/2018 1245 Pathology      Findings: Multiple intramural and exophytic fibroids weighing 1500g. No evidence of entry into uterine cavity. Complications: 2U PRBC transfused due to acute blood loss.   Implants: * No implants in log *

## 2018-06-28 NOTE — IP AVS SNAPSHOT
[address] [facility] 
[phone] Patient: Jaden White MRN: [id number] About your hospitalization You were admitted on:  June 28, 2018 You last received care in the:  [address] [place] [address] You were discharged on:  June 30, 2018 Why you were hospitalized Your primary diagnosis was:  Not on File Your diagnoses also included:  Anemia, Fibroid Follow-up Information Follow up With Details Comments Contact Info Avelina Bruce, MD   Patient can only remember the practice name and not the physician Discharge Orders None A check daniel indicates which time of day the medication should be taken. My Medications START taking these medications Instructions Each Dose to Equal  
 Morning Noon Evening Bedtime  
 oxyCODONE-acetaminophen 5-325 mg per tablet Commonly known as:  PERCOCET Your last dose was: Your next dose is: Take 1 Tab by mouth every four (4) hours as needed. Max Daily Amount: 6 Tabs. 1 Tab CHANGE how you take these medications Instructions Each Dose to Equal  
 Morning Noon Evening Bedtime  
 ibuprofen 600 mg tablet Commonly known as:  MOTRIN What changed:   
- medication strength 
- how much to take - when to take this Your last dose was: Your next dose is: Take 1 Tab by mouth every six (6) hours as needed for Pain. 600 mg CONTINUE taking these medications Instructions Each Dose to Equal  
 Morning Noon Evening Bedtime Iron 325 mg (65 mg iron) tablet Generic drug:  ferrous sulfate Your last dose was: Your next dose is: Take  by mouth daily. JUNEL 1/20 (21) 1-20 mg-mcg Tab Generic drug:  norethindrone-ethinyl estradiol Your last dose was: Your next dose is: Take  by mouth daily. multivitamin tablet Commonly known as:  ONE A DAY Your last dose was: Your next dose is: Take 1 Tab by mouth Every Tues, Thur & Sat.  
 1 Tab Where to Get Your Medications Information on where to get these meds will be given to you by the nurse or doctor. ! Ask your nurse or doctor about these medications  
  ibuprofen 600 mg tablet  
 oxyCODONE-acetaminophen 5-325 mg per tablet Opioid Education Prescription Opioids: What You Need to Know: 
 
Prescription opioids can be used to help relieve moderate-to-severe pain and are often prescribed following a surgery or injury, or for certain health conditions. These medications can be an important part of treatment but also come with serious risks. Opioids are strong pain medicines. Examples include hydrocodone, oxycodone, fentanyl, and morphine. Heroin is an example of an illegal opioid. It is important to work with your health care provider to make sure you are getting the safest, most effective care. WHAT ARE THE RISKS AND SIDE EFFECTS OF OPIOID USE? Prescription opioids carry serious risks of addiction and overdose, especially with prolonged use. An opioid overdose, often marked by slow breathing, can cause sudden death. The use of prescription opioids can have a number of side effects as well, even when taken as directed. · Tolerance-meaning you might need to take more of a medication for the same pain relief · Physical dependence-meaning you have symptoms of withdrawal when the medication is stopped. Withdrawal symptoms can include nausea, sweating, chills, diarrhea, stomach cramps, and muscle aches. Withdrawal can last up to several weeks, depending on which drug you took and how long you took it. · Increased sensitivity to pain · Constipation · Nausea, vomiting, and dry mouth · Sleepiness and dizziness · Confusion · Depression · Low levels of testosterone that can result in lower sex drive, energy, and strength · Itching and sweating RISKS ARE GREATER WITH:      
· History of drug misuse, substance use disorder, or overdose · Mental health conditions (such as depression or anxiety) · Sleep apnea · Older age (72 years or older) · Pregnancy Avoid alcohol while taking prescription opioids. Also, unless specifically advised by your health care provider, medications to avoid include: · Benzodiazepines (such as Xanax or Valium) · Muscle relaxants (such as Soma or Flexeril) · Hypnotics (such as Ambien or Lunesta) · Other prescription opioids KNOW YOUR OPTIONS Talk to your health care provider about ways to manage your pain that don't involve prescription opioids. Some of these options may actually work better and have fewer risks and side effects. Options may include: 
· Pain relievers such as acetaminophen, ibuprofen, and naproxen · Some medications that are also used for depression or seizures · Physical therapy and exercise · Counseling to help patients learn how to cope better with triggers of pain and stress. · Application of heat or cold compress · Massage therapy · Relaxation techniques Be Informed Make sure you know the name of your medication, how much and how often to take it, and its potential risks & side effects. IF YOU ARE PRESCRIBED OPIOIDS FOR PAIN: 
· Never take opioids in greater amounts or more often than prescribed. Remember the goal is not to be pain-free but to manage your pain at a tolerable level. · Follow up with your primary care provider to: · Work together to create a plan on how to manage your pain. · Talk about ways to help manage your pain that don't involve prescription opioids. · Talk about any and all concerns and side effects. · Help prevent misuse and abuse. · Never sell or share prescription opioids · Help prevent misuse and abuse. · Store prescription opioids in a secure place and out of reach of others (this may include visitors, children, friends, and family). · Safely dispose of unused/unwanted prescription opioids: Find your community drug take-back program or your pharmacy mail-back program, or flush them down the toilet, following guidance from the Food and Drug Administration (www.fda.gov/Drugs/ResourcesForYou). · Visit www.cdc.gov/drugoverdose to learn about the risks of opioid abuse and overdose. · If you believe you may be struggling with addiction, tell your health care provider and ask for guidance or call [employer] at 2-010-775-FRHQ. Discharge Instructions Abdominal Myomectomy: What to Expect at [facility] Your Recovery You can expect to feel better and stronger each day, although you may tire quickly and need pain medicine for a week or two. You may need about 4 to 6 weeks to fully recover. Do not lift anything heavy while you are recovering so that your incision and your belly muscles can heal. 
This care sheet gives you a general idea about how long it will take for you to recover. But each person recovers at a different pace. Follow the steps below to get better as quickly as possible. How can you care for yourself at home? Activity ? · Rest when you feel tired. Getting enough sleep will help you recover. ? · Try to walk each day. Start out by walking a little more than you did the day before. Bit by bit, increase the amount you walk. Walking boosts blood flow and helps prevent pneumonia and constipation. ? · For 4 to 6 weeks, avoid lifting anything that would make you strain. This may include a child, heavy grocery bags and milk containers, a heavy briefcase or backpack, cat litter or dog food bags, or a vacuum .   
? · Avoid strenuous activities, such as biking, jogging, weightlifting, and aerobic exercise, for 4 to 6 weeks. ? · You may shower. Pat the incision dry when you are done. Do not take a bath for the first week after surgery or until your doctor tells you it is okay. ? · You may have some light vaginal bleeding. Wear sanitary pads if needed. Do not douche or use tampons. ? · Ask your doctor when you can drive again. ? · You will probably need to take 2 to 4 weeks off work. It depends on the type of work you do and how you feel. ? · Do not have sex until your doctor tells you it is okay. ? · Talk about birth control with your doctor. Do not try to become pregnant until your doctor says it is okay. Diet ? · You can eat your normal diet. If your stomach is upset, try bland, low-fat foods like plain rice, broiled chicken, toast, and yogurt. ? · Drink plenty of fluids (unless your doctor tells you not to). ? · You may notice that your bowel movements are not regular right after your surgery. This is common. Try to avoid constipation and straining with bowel movements. You may want to take a fiber supplement every day. If you have not had a bowel movement after a couple of days, ask your doctor about taking a mild laxative. Medicines ? · Your doctor will tell you if and when you can restart your medicines. He or she will also give you instructions about taking any new medicines. ? · If you take blood thinners, such as warfarin (Coumadin), clopidogrel (Plavix), or aspirin, be sure to talk to your doctor. He or she will tell you if and when to start taking those medicines again. Make sure that you understand exactly what your doctor wants you to do. ? · Take pain medicines exactly as directed. ¨ If the doctor gave you a prescription medicine for pain, take it as prescribed.  
¨ If you are not taking a prescription pain medicine, take an over-the-counter medicine such as acetaminophen (Tylenol), ibuprofen (Advil, Motrin), or naproxen (Aleve). Read and follow all instructions on the label. Do not take two or more pain medicines at the same time unless the doctor told you to. Many pain medicines contain acetaminophen, which is Tylenol. Too much acetaminophen (Tylenol) can be harmful. ? · If you think your pain medicine is making you sick to your stomach: 
¨ Take your medicine after meals (unless your doctor tells you not to). ¨ Ask your doctor for a different pain medicine. ? · If your doctor prescribed antibiotics, take them as directed. Do not stop taking them just because you feel better. You need to take the full course of antibiotics. Incision care ? · If you have strips of tape on the cut (incision) the doctor made, leave the tape on for a week or until it falls off.  
? · Wash the area daily with warm, soapy water and pat it dry. ? · Keep the area clean and dry. You may cover it with a gauze bandage if it weeps or rubs against clothing. Change the bandage every day. Other instructions ? · Wear loose, comfortable clothing and avoid anything that puts pressure on your belly for a few weeks. Follow-up care is a key part of your treatment and safety. Be sure to make and go to all appointments, and call your doctor if you are having problems. It's also a good idea to know your test results and keep a list of the medicines you take. When should you call for help? Call 911 anytime you think you may need emergency care. For example, call if: 
? · You passed out (lost consciousness). ? · You have chest pain, are short of breath, or cough up blood. ?Call your doctor now or seek immediate medical care if: 
? · You have pain that does not get better after you take pain medicine. ? · You cannot pass stools or gas. ? · You have vaginal discharge that has increased in amount or smells bad.  
? · You are sick to your stomach or cannot drink fluids. ? · You have loose stitches, or your incision comes open. ? · Bright red blood has soaked through the bandage over your incision. ? · You have signs of infection, such as: 
¨ Increased pain, swelling, warmth, or redness. ¨ Red streaks leading from the incision. ¨ Pus draining from the incision. ¨ A fever. ? · You have bright red vaginal bleeding that soaks one or more pads in an hour, or you have large clots. ? · You have signs of a blood clot in your leg (called a deep vein thrombosis), such as: 
¨ Pain in your calf, back of the knee, thigh, or groin. ¨ Redness and swelling in your leg or groin. ? Watch closely for any changes in your health, and be sure to contact your doctor if you have any problems. Where can you learn more? Go to http://kennedy-orly.info/. Enter P432 in the search box to learn more about \"Abdominal Myomectomy: What to Expect at Home. \" Current as of: October 13, 2016 Content Version: 11.4 © 7198-5263 [employer]. Care instructions adapted under license by [employer] (which disclaims liability or warranty for this information). If you have questions about a medical condition or this instruction, always ask your healthcare professional. [address] any warranty or liability for your use of this information. [employer] Announcement We are excited to announce that we are making your provider's discharge notes available to you in [employer]. You will see these notes when they are completed and signed by the physician that discharged you from your recent hospital stay. If you have any questions or concerns about any information you see in [employer], please call the Health Information Department where you were seen or reach out to your Primary Care Provider for more information about your plan of care. Introducing [facility]!    
 [employer] introduces [employer] patient portal. Now you can access parts of your medical record, email your doctor's office, and request medication refills online. 1. In your internet browser, go to [email or web address] 2. Click on the First Time User? Click Here link in the Sign In box. You will see the New Member Sign Up page. 3. Enter your [employer] Access Code exactly as it appears below. You will not need to use this code after youve completed the sign-up process. If you do not sign up before the expiration date, you must request a new code. · [employer] Access Code: [email or web address] Expires: 9/20/2018  8:30 AM 
 
4. Enter the last four digits of your Social Security Number (xxxx) and Date of Birth (mm/dd/yyyy) as indicated and click Submit. You will be taken to the next sign-up page. 5. Create a Active-[employer]i ID. This will be your [employer] login ID and cannot be changed, so think of one that is secure and easy to remember. 6. Create a [employer] password. You can change your password at any time. 7. Enter your Password Reset Question and Answer. This can be used at a later time if you forget your password. 8. Enter your e-mail address. You will receive e-mail notification when new information is available in [address] 9. Click Sign Up. You can now view and download portions of your medical record. 10. Click the Download Summary menu link to download a portable copy of your medical information. If you have questions, please visit the Frequently Asked Questions section of the [employer] website. Remember, [employer] is NOT to be used for urgent needs. For medical emergencies, dial 911. Now available from your iPhone and Android! Introducing Brigido Razo As a [facility] patient, I wanted to make you aware of our electronic visit tool called Brigido Razo. [facility] 24/7 allows you to connect within minutes with a medical provider 24 hours a day, seven days a week via a mobile device or tablet or logging into a secure website from your computer. You can access BEKIZ from anywhere in the [place]. A virtual visit might be right for you when you have a simple condition and feel like you just dont want to get out of bed, or cant get away from work for an appointment, when your regular [employer] provider is not available (evenings, weekends or holidays), or when youre out of town and need minor care. Electronic visits cost only $49 and if the [employer] 24/7 provider determines a prescription is needed to treat your condition, one can be electronically transmitted to a nearby pharmacy*. Please take a moment to enroll today if you have not already done so. The enrollment process is free and takes just a few minutes. To enroll, please download the [employer] 24/7 regulo to your tablet or phone, or visit [email or web address] to enroll on your computer. And, as an [address] [place] [address] patient with a [employer] account, the results of your visits will be scanned into your electronic medical record and your primary care provider will be able to view the scanned results. We urge you to continue to see your regular [employer] provider for your ongoing medical care. And while your primary care provider may not be the one available when you seek a BEKIZ virtual visit, the peace of mind you get from getting a real diagnosis real time can be priceless. For more information on BEKIZ, view our Frequently Asked Questions (FAQs) at [email or web address]. Sincerely, 
 
Clara Gilmore MD 
Chief Medical Officer Neymar8 Ivory Healy *:  certain medications cannot be prescribed via BEKIZ Providers Seen During Your Hospitalization Provider Specialty Primary office phone Alexus Maurer MD Obstetrics & Gynecology [phone] Your Primary Care Physician (PCP) Primary Care Physician Office Phone Office Fax OTHER, PHYS ** None ** ** None ** You are allergic to the following Allergen Reactions Latex Rash Recent Documentation Height Weight BMI OB Status Smoking Status 1.778 m 85.4 kg 27.01 kg/m2 Having regular periods Never Smoker Emergency Contacts Name Discharge Info Relation Home Work Mobile Karoline Da Silva CAREGIVER [3] Mother [14] [phone] Patient Belongings The following personal items are in your possession at time of discharge: 
     Visual Aid: Glasses, With patient      Home Medications: None   Jewelry: None  Clothing:  (clothing to PACU)    Other Valuables: Eyeglasses Please provide this summary of care documentation to your next provider. Signatures-by signing, you are acknowledging that this After Visit Summary has been reviewed with you and you have received a copy. Patient Signature:  ____________________________________________________________ Date:  ____________________________________________________________  
  
[facility], [facility]–1983 Provider Signature:  ____________________________________________________________ Date:  ____________________________________________________________

## 2018-06-28 NOTE — PERIOP NOTES
TRANSFER - OUT REPORT:    Verbal report given to [facility] RN(name) on Aleskey Cooper  being transferred to [facility](unit) for routine post - op       Report consisted of patients Situation, Background, Assessment and   Recommendations(SBAR). Time Pre op antibiotic given:Y  Anesthesia Stop time: Y  Bernabe Present on Transfer to floor:Y  Order for Bernabe on Chart:Y  Discharge Prescriptions with Chart:N    Information from the following report(s) SBAR was reviewed with the receiving nurse. Opportunity for questions and clarification was provided. Is the patient on 02? NO       L/Min        Other     Is the patient on a monitor? NO    Is the nurse transporting with the patient? NO    Surgical Waiting Area notified of patient's transfer from PACU? YES      The following personal items collected during your admission accompanied patient upon transfer:   Dental Appliance:    Vision: Visual Aid: Glasses  Hearing Aid:    Jewelry: Jewelry: None  Clothing: Clothing:  (clothing to PACU)  Other Valuables:  Other Valuables: Eyeglasses  Valuables sent to safe:

## 2018-06-28 NOTE — ANESTHESIA PREPROCEDURE EVALUATION
Anesthetic History   No history of anesthetic complications            Review of Systems / Medical History  Patient summary reviewed, nursing notes reviewed and pertinent labs reviewed    Pulmonary  Within defined limits                 Neuro/Psych   Within defined limits           Cardiovascular  Within defined limits                     GI/Hepatic/Renal  Within defined limits   GERD           Endo/Other  Within defined limits           Other Findings              Physical Exam    Airway  Mallampati: II  TM Distance: > 6 cm  Neck ROM: normal range of motion   Mouth opening: Normal     Cardiovascular  Regular rate and rhythm,  S1 and S2 normal,  no murmur, click, rub, or gallop             Dental  No notable dental hx       Pulmonary  Breath sounds clear to auscultation               Abdominal  GI exam deferred       Other Findings            Anesthetic Plan    ASA: 2  Anesthesia type: general          Induction: Intravenous  Anesthetic plan and risks discussed with: Patient

## 2018-06-28 NOTE — ANESTHESIA POSTPROCEDURE EVALUATION
Post-Anesthesia Evaluation and Assessment    Patient: Sweetie Apple MRN: [id number]  SSN: [id number]    YOB: 1985  Age: 26 y.o. Sex: female       Cardiovascular Function/Vital Signs  Visit Vitals    BP 97/53    Pulse 99    Temp 36.9 °C (98.4 °F)    Resp 17    Ht 5' 10\" (1.778 m)    Wt 85.4 kg (188 lb 4 oz)    SpO2 100%    BMI 27.01 kg/m2       Patient is status post general anesthesia for Procedure(s):  ABDOMINAL MYOMECTOMY . Nausea/Vomiting: None    Postoperative hydration reviewed and adequate. Pain:  Pain Scale 1: Numeric (0 - 10) (06/28/18 1420)  Pain Intensity 1: 9 (06/28/18 1420)   Managed    Neurological Status:   Neuro (WDL): Exceptions to WDL (06/28/18 1255)  Neuro  Neurologic State: Sleeping;Eyes open to voice (06/28/18 1255)   At baseline    Mental Status and Level of Consciousness: Arousable    Pulmonary Status:   O2 Device: Room air (06/28/18 1255)   Adequate oxygenation and airway patent    Complications related to anesthesia: None    Post-anesthesia assessment completed.  No concerns    Signed By: Renetta Galvan MD     June 28, 2018

## 2018-06-28 NOTE — PROGRESS NOTES
Dr. Olga Murillo was called about patient still reporting pain 9/10. er adult non-verbal is 1-2 and she is sleeping now. He said ok to send her to floor.

## 2018-06-29 LAB
ERYTHROCYTE [DISTWIDTH] IN BLOOD BY AUTOMATED COUNT: 17.8 % (ref 11.5–14.5)
HCT VFR BLD AUTO: 24.4 % (ref 35–47)
HGB BLD-MCNC: 10 G/DL (ref 11.5–16)
HGB BLD-MCNC: 5.4 G/DL (ref 11.5–16)
HGB BLD-MCNC: 5.9 G/DL (ref 11.5–16)
HGB BLD-MCNC: 6.8 G/DL (ref 11.5–16)
HGB BLD-MCNC: 7.9 G/DL (ref 11.5–16)
HGB BLD-MCNC: 8.4 G/DL (ref 11.5–16)
HGB BLD-MCNC: 8.5 G/DL (ref 11.5–16)
HGB BLD-MCNC: 8.7 G/DL (ref 11.5–16)
HGB BLD-MCNC: 9 G/DL (ref 11.5–16)
MCH RBC QN AUTO: 29.3 PG (ref 26–34)
MCHC RBC AUTO-ENTMCNC: 32.4 G/DL (ref 30–36.5)
MCV RBC AUTO: 90.4 FL (ref 80–99)
NRBC # BLD: 0 K/UL (ref 0–0.01)
NRBC BLD-RTO: 0 PER 100 WBC
PLATELET # BLD AUTO: 164 K/UL (ref 150–400)
PMV BLD AUTO: 12.5 FL (ref 8.9–12.9)
RBC # BLD AUTO: 2.7 M/UL (ref 3.8–5.2)
WBC # BLD AUTO: 17.4 K/UL (ref 3.6–11)

## 2018-06-29 PROCEDURE — 85027 COMPLETE CBC AUTOMATED: CPT | Performed by: OBSTETRICS & GYNECOLOGY

## 2018-06-29 PROCEDURE — 74011250636 HC RX REV CODE- 250/636: Performed by: OBSTETRICS & GYNECOLOGY

## 2018-06-29 PROCEDURE — 65410000002 HC RM PRIVATE OB

## 2018-06-29 PROCEDURE — 36415 COLL VENOUS BLD VENIPUNCTURE: CPT | Performed by: OBSTETRICS & GYNECOLOGY

## 2018-06-29 PROCEDURE — 74011250637 HC RX REV CODE- 250/637: Performed by: OBSTETRICS & GYNECOLOGY

## 2018-06-29 RX ORDER — IBUPROFEN 600 MG/1
600 TABLET ORAL
Qty: 30 TAB | Refills: 1 | Status: SHIPPED | OUTPATIENT
Start: 2018-06-29

## 2018-06-29 RX ORDER — OXYCODONE AND ACETAMINOPHEN 5; 325 MG/1; MG/1
2 TABLET ORAL EVERY 6 HOURS
Status: DISCONTINUED | OUTPATIENT
Start: 2018-06-29 | End: 2018-06-30 | Stop reason: HOSPADM

## 2018-06-29 RX ORDER — OXYCODONE AND ACETAMINOPHEN 5; 325 MG/1; MG/1
1 TABLET ORAL
Qty: 28 TAB | Refills: 0 | Status: SHIPPED | OUTPATIENT
Start: 2018-06-29

## 2018-06-29 RX ORDER — IBUPROFEN 600 MG/1
600 TABLET ORAL EVERY 6 HOURS
Status: DISCONTINUED | OUTPATIENT
Start: 2018-06-29 | End: 2018-06-29 | Stop reason: SDUPTHER

## 2018-06-29 RX ORDER — SIMETHICONE 80 MG
80 TABLET,CHEWABLE ORAL
Status: DISCONTINUED | OUTPATIENT
Start: 2018-06-29 | End: 2018-06-30 | Stop reason: HOSPADM

## 2018-06-29 RX ORDER — OXYCODONE AND ACETAMINOPHEN 5; 325 MG/1; MG/1
2 TABLET ORAL
Status: DISCONTINUED | OUTPATIENT
Start: 2018-06-29 | End: 2018-06-29

## 2018-06-29 RX ORDER — KETOROLAC TROMETHAMINE 30 MG/ML
30 INJECTION, SOLUTION INTRAMUSCULAR; INTRAVENOUS EVERY 6 HOURS
Status: DISPENSED | OUTPATIENT
Start: 2018-06-29 | End: 2018-06-30

## 2018-06-29 RX ORDER — IBUPROFEN 600 MG/1
600 TABLET ORAL EVERY 6 HOURS
Status: DISCONTINUED | OUTPATIENT
Start: 2018-06-30 | End: 2018-06-30 | Stop reason: HOSPADM

## 2018-06-29 RX ADMIN — KETOROLAC TROMETHAMINE 30 MG: 30 INJECTION, SOLUTION INTRAMUSCULAR; INTRAVENOUS at 04:07

## 2018-06-29 RX ADMIN — Medication 10 ML: at 14:31

## 2018-06-29 RX ADMIN — OXYCODONE HYDROCHLORIDE AND ACETAMINOPHEN 2 TABLET: 5; 325 TABLET ORAL at 12:28

## 2018-06-29 RX ADMIN — KETOROLAC TROMETHAMINE 30 MG: 30 INJECTION, SOLUTION INTRAMUSCULAR; INTRAVENOUS at 14:30

## 2018-06-29 RX ADMIN — Medication 5 ML: at 22:00

## 2018-06-29 RX ADMIN — OXYCODONE HYDROCHLORIDE AND ACETAMINOPHEN 2 TABLET: 5; 325 TABLET ORAL at 18:11

## 2018-06-29 RX ADMIN — ONDANSETRON 4 MG: 2 INJECTION INTRAMUSCULAR; INTRAVENOUS at 22:36

## 2018-06-29 RX ADMIN — IBUPROFEN 600 MG: 600 TABLET ORAL at 08:50

## 2018-06-29 RX ADMIN — KETOROLAC TROMETHAMINE 30 MG: 30 INJECTION, SOLUTION INTRAMUSCULAR; INTRAVENOUS at 19:36

## 2018-06-29 RX ADMIN — Medication 5 ML: at 22:38

## 2018-06-29 NOTE — PROGRESS NOTES
2050- pt c/o of nausea and dizziness upon getting up out of bed. Pt. States occasionally has episodes of vertigo. Pt. Had one episode of dry heaving. Pt. Sat on side of bed and was medicated with zofran. Pt. Stated felt better few minuets later and wanted to try to walk. 2100 - Pt. ambulated with assistance to nurses station and back to room.      2230 - Primary Nurse Queenie Ramirez RN and Lennox Milian, RN performed a dual skin assessment on this patient No impairment noted  Ravinder score is 20

## 2018-06-29 NOTE — DISCHARGE INSTRUCTIONS
Abdominal Myomectomy: What to Expect at [address] can expect to feel better and stronger each day, although you may tire quickly and need pain medicine for a week or two. You may need about 4 to 6 weeks to fully recover. Do not lift anything heavy while you are recovering so that your incision and your belly muscles can heal.  This care sheet gives you a general idea about how long it will take for you to recover. But each person recovers at a different pace. Follow the steps below to get better as quickly as possible. How can you care for yourself at home? Activity  ? · Rest when you feel tired. Getting enough sleep will help you recover. ? · Try to walk each day. Start out by walking a little more than you did the day before. Bit by bit, increase the amount you walk. Walking boosts blood flow and helps prevent pneumonia and constipation. ? · For 4 to 6 weeks, avoid lifting anything that would make you strain. This may include a child, heavy grocery bags and milk containers, a heavy briefcase or backpack, cat litter or dog food bags, or a vacuum . ? · Avoid strenuous activities, such as biking, jogging, weightlifting, and aerobic exercise, for 4 to 6 weeks. ? · You may shower. Pat the incision dry when you are done. Do not take a bath for the first week after surgery or until your doctor tells you it is okay. ? · You may have some light vaginal bleeding. Wear sanitary pads if needed. Do not douche or use tampons. ? · Ask your doctor when you can drive again. ? · You will probably need to take 2 to 4 weeks off work. It depends on the type of work you do and how you feel. ? · Do not have sex until your doctor tells you it is okay. ? · Talk about birth control with your doctor. Do not try to become pregnant until your doctor says it is okay. Diet  ? · You can eat your normal diet.  If your stomach is upset, try bland, low-fat foods like plain rice, broiled chicken, toast, and yogurt. ? · Drink plenty of fluids (unless your doctor tells you not to). ? · You may notice that your bowel movements are not regular right after your surgery. This is common. Try to avoid constipation and straining with bowel movements. You may want to take a fiber supplement every day. If you have not had a bowel movement after a couple of days, ask your doctor about taking a mild laxative. Medicines  ? · Your doctor will tell you if and when you can restart your medicines. He or she will also give you instructions about taking any new medicines. ? · If you take blood thinners, such as warfarin (Coumadin), clopidogrel (Plavix), or aspirin, be sure to talk to your doctor. He or she will tell you if and when to start taking those medicines again. Make sure that you understand exactly what your doctor wants you to do. ? · Take pain medicines exactly as directed. ¨ If the doctor gave you a prescription medicine for pain, take it as prescribed. ¨ If you are not taking a prescription pain medicine, take an over-the-counter medicine such as acetaminophen (Tylenol), ibuprofen (Advil, Motrin), or naproxen (Aleve). Read and follow all instructions on the label. Do not take two or more pain medicines at the same time unless the doctor told you to. Many pain medicines contain acetaminophen, which is Tylenol. Too much acetaminophen (Tylenol) can be harmful. ? · If you think your pain medicine is making you sick to your stomach:  ¨ Take your medicine after meals (unless your doctor tells you not to). ¨ Ask your doctor for a different pain medicine. ? · If your doctor prescribed antibiotics, take them as directed. Do not stop taking them just because you feel better. You need to take the full course of antibiotics. Incision care  ?  · If you have strips of tape on the cut (incision) the doctor made, leave the tape on for a week or until it falls off.   ? · Wash the area daily with warm, soapy water and pat it dry. ? · Keep the area clean and dry. You may cover it with a gauze bandage if it weeps or rubs against clothing. Change the bandage every day. Other instructions  ? · Wear loose, comfortable clothing and avoid anything that puts pressure on your belly for a few weeks. Follow-up care is a key part of your treatment and safety. Be sure to make and go to all appointments, and call your doctor if you are having problems. It's also a good idea to know your test results and keep a list of the medicines you take. When should you call for help? Call 911 anytime you think you may need emergency care. For example, call if:  ? · You passed out (lost consciousness). ? · You have chest pain, are short of breath, or cough up blood. ?Call your doctor now or seek immediate medical care if:  ? · You have pain that does not get better after you take pain medicine. ? · You cannot pass stools or gas. ? · You have vaginal discharge that has increased in amount or smells bad.   ? · You are sick to your stomach or cannot drink fluids. ? · You have loose stitches, or your incision comes open. ? · Bright red blood has soaked through the bandage over your incision. ? · You have signs of infection, such as:  ¨ Increased pain, swelling, warmth, or redness. ¨ Red streaks leading from the incision. ¨ Pus draining from the incision. ¨ A fever. ? · You have bright red vaginal bleeding that soaks one or more pads in an hour, or you have large clots. ? · You have signs of a blood clot in your leg (called a deep vein thrombosis), such as:  ¨ Pain in your calf, back of the knee, thigh, or groin. ¨ Redness and swelling in your leg or groin. ? Watch closely for any changes in your health, and be sure to contact your doctor if you have any problems. Where can you learn more? Go to http://kennedy-orly.info/.   Enter O661 in the search box to learn more about \"Abdominal Myomectomy: What to Expect at Home. \"  Current as of: October 13, 2016  Content Version: 11.4  © 5184-8330 [employer], [place]. Care instructions adapted under license by [employer] (which disclaims liability or warranty for this information). If you have questions about a medical condition or this instruction, always ask your healthcare professional. [place] [address] any warranty or liability for your use of this information.

## 2018-06-29 NOTE — PROGRESS NOTES
Reason for Admission:   Leiomyoma, fibroid. S/p abdominal myomectomy                   RRAT Score:  0                   Plan for utilizing home health:  No needs for home health                        Likelihood of Readmission: low                         Transition of Care Plan:   Return home and f/u with MD    Care Management Interventions  Palliative Care Criteria Met (RRAT>21 & CHF Dx)?: No  Mode of Transport at Discharge:  Other (see comment) (mother)  Current Support Network: Lives Alone (she will stay with her mother after discharge)  Confirm Follow Up Transport: Self  Plan discussed with Pt/Family/Caregiver: Yes  Freedom of Choice Offered: Yes  Discharge Location  Discharge Placement: Home     William Roque RN ACM CRM

## 2018-06-29 NOTE — PROGRESS NOTES
Problem: Pain  Goal: *Control of Pain  Outcome: Not Progressing Towards Goal  Pt is having a difficult time with pain management. Her PCA is somewhat effective, but she prefers Toradol over her Dilaudid PCA.

## 2018-06-29 NOTE — PROGRESS NOTES
Bedside and Verbal shift change report given to Jennifer aRpp RN (oncoming nurse) by Gillian Borrego RN (offgoing nurse). Report included the following information SBAR, Kardex, Procedure Summary, Intake/Output, MAR, Accordion and Recent Results.

## 2018-06-29 NOTE — PROGRESS NOTES
Bedside and Verbal shift change report given to [address] (oncoming nurse) by Akin Spears RN  (offgoing nurse). Report included the following information SBAR, Kardex, OR Summary, Procedure Summary, Intake/Output, MAR and Recent Results.

## 2018-06-29 NOTE — DISCHARGE SUMMARY
Gynecology Discharge Summary     Patient ID:  Echo Arellano  [id number]  8 y.o.  1985    Admit date: 6/28/2018    Discharge date: 6/30/2018    Admission Diagnoses:   Patient Active Problem List   Diagnosis Code    Anemia D64.9    Fibroid D25.9       Discharge Diagnoses: No discharge information exists for this patient. Patient Active Problem List   Diagnosis Code    Anemia D64.9    Fibroid D25.9       Procedures for this admission: Procedure(s):  [facility]:  Uncomplicated myomectomy. 2U PRBC transfused due to intraop blood loss    Disposition: Home or self care    Discharged Condition: stable            Patient Instructions:   Current Discharge Medication List      START taking these medications    Details   oxyCODONE-acetaminophen (PERCOCET) 5-325 mg per tablet Take 1 Tab by mouth every four (4) hours as needed. Max Daily Amount: 6 Tabs. Qty: 28 Tab, Refills: 0    Associated Diagnoses: Intramural and subserous leiomyoma of uterus         CONTINUE these medications which have CHANGED    Details   ibuprofen (MOTRIN) 600 mg tablet Take 1 Tab by mouth every six (6) hours as needed for Pain. Qty: 30 Tab, Refills: 1         CONTINUE these medications which have NOT CHANGED    Details   multivitamin (ONE A DAY) tablet Take 1 Tab by mouth Every Tues, Thur & Sat.      ferrous sulfate (IRON) 325 mg (65 mg iron) tablet Take  by mouth daily. norethindrone-ethinyl estradiol (JUNEL 1/20, 21,) 1-20 mg-mcg tab Take  by mouth daily. Activity: Activity as tolerated, no driving on narcotics, nothing in vagina, no strenuous exercise  Diet: Regular Diet  Wound Care: Keep wound clean and dry    Follow-up with Dr Jany Villar in 2 weeks.     Signed:  Alexus Maurer MD  6/29/2018  7:35 AM

## 2018-06-29 NOTE — PROGRESS NOTES
Patient sore, but feeling well. Tolerating diet. +urinated, no flatus yet.   VSS (mildly tachy)  - work on pain control

## 2018-06-29 NOTE — PROGRESS NOTES
Gynecology Post Operative Note    Glen Copeland    Assessment: Doing well post-op    Plan: Continue routine post-op care  Advance diet  HLIV  Oral pain medications  Ambulate  DC home today if meeting milestones otherwise plan for tomorrow. Post-op day 1 from Procedure(s):  ABDOMINAL MYOMECTOMY   She is without significant complaints. Feels like toradol was helping, didn't really feel like PCA did. Bernabe out but patient has not voided yet. Patient is not passing flatus. She is is tolerating her diet -- although she only had clears yesterday. Pt did ambulate x1 last night. Orders/Charges: Medium    Vitals:  Visit Vitals    /74 (BP 1 Location: Right arm, BP Patient Position: At rest)    Pulse (!) 104    Temp 98.7 °F (37.1 °C)    Resp 16    Ht 5' 10\" (1.778 m)    Wt 85.4 kg (188 lb 4 oz)    LMP 06/10/2018 (Approximate)    SpO2 100%    BMI 27.01 kg/m2     Temp (24hrs), Av °F (36.7 °C), Min:97.3 °F (36.3 °C), Max:98.7 °F (37.1 °C)      Last 24hr Input/Output:    Intake/Output Summary (Last 24 hours) at 18 0729  Last data filed at 18 0518   Gross per 24 hour   Intake          4418.33 ml   Output             2390 ml   Net          2028.33 ml          Exam:  Patient without distress. Lungs clear              Abdomen soft, bowel sounds present, expected tenderness. Incision dry and clean without erythema. Lower extremities are negative for swelling, cords, or tenderness.     Labs: Lab Results   Component Value Date/Time    WBC 17.4 (H) 2018 05:08 AM    WBC 15.6 (H) 2018 08:27 PM    WBC 3.5 (L) 2018 09:09 AM    HGB 7.9 (L) 2018 05:08 AM    HGB 9.1 (L) 2018 08:27 PM    HGB 10.5 (L) 2018 09:09 AM    HCT 24.4 (L) 2018 05:08 AM    HCT 28.6 (L) 2018 08:27 PM    HCT 34.2 (L) 2018 09:09 AM    PLATELET 566  05:08 AM    PLATELET 423  08:27 PM    PLATELET 583  09:09 AM       Recent Results (from the past 24 hour(s))   HCG URINE, QL. - POC    Collection Time: 06/28/18  7:50 AM   Result Value Ref Range    Pregnancy test,urine (POC) NEGATIVE  NEG     CBC W/O DIFF    Collection Time: 06/28/18  8:27 PM   Result Value Ref Range    WBC 15.6 (H) 3.6 - 11.0 K/uL    RBC 3.17 (L) 3.80 - 5.20 M/uL    HGB 9.1 (L) 11.5 - 16.0 g/dL    HCT 28.6 (L) 35.0 - 47.0 %    MCV 90.2 80.0 - 99.0 FL    MCH 28.7 26.0 - 34.0 PG    MCHC 31.8 30.0 - 36.5 g/dL    RDW 17.2 (H) 11.5 - 14.5 %    PLATELET 918 606 - 703 K/uL    MPV 11.5 8.9 - 12.9 FL    NRBC 0.0 0  WBC    ABSOLUTE NRBC 0.00 0.00 - 0.01 K/uL   CBC W/O DIFF    Collection Time: 06/29/18  5:08 AM   Result Value Ref Range    WBC 17.4 (H) 3.6 - 11.0 K/uL    RBC 2.70 (L) 3.80 - 5.20 M/uL    HGB 7.9 (L) 11.5 - 16.0 g/dL    HCT 24.4 (L) 35.0 - 47.0 %    MCV 90.4 80.0 - 99.0 FL    MCH 29.3 26.0 - 34.0 PG    MCHC 32.4 30.0 - 36.5 g/dL    RDW 17.8 (H) 11.5 - 14.5 %    PLATELET 112 829 - 590 K/uL    MPV 12.5 8.9 - 12.9 FL    NRBC 0.0 0  WBC    ABSOLUTE NRBC 0.00 0.00 - 0.01 K/uL

## 2018-06-29 NOTE — PROGRESS NOTES
Bedside and Verbal shift change report given to Rohini Doherty (oncoming nurse) by Lizy Hassan (offgoing nurse). Report included the following information SBAR, Kardex, OR Summary, Procedure Summary, Intake/Output and MAR.

## 2018-06-29 NOTE — OP NOTES
Abdominal Myomectomy Operative Note    Name: Ryan Chau Record Number: [id number]   YOB: 1985  Today's Date: June 28, 2018      Pre-operative Diagnosis: LEIOMYOMA    Post-operative Diagnosis: LEIOMYOMA    Operation: ABDOMINAL MYOMECTOMY     Surgeon(s):  MD Daina Villatoro MD    Anesthesia: General    Prophylactic Antibiotics: Ancef  DVT Prophylaxis: Sequential Compression Devices             Estimated Blood Loss (ml):  1000 mL    Fluids: 3100 mL LR; 500 Albumin, 2U PRBC    UOP: 235 mL    Specimens: 28 uterine fibroids, weighing grossly 1500g               Procedure Detail:      After proper patient identification and consent, the patient was taken to the operating room, where general anesthesia was administered and found to be adequate. Bernabe catheter had been placed using sterile technique. The patient was prepped and draped in the normal sterile fashion. The abdomen was entered using the Pfannenstiel technique and carried down to the fascia. The fascia was incised sharply and extended laterally. The inferior fascial edge was grasped with two Kocher clamps and sharply dissected off of the rectus muscles. The superior aspect of the fascial edge was then grasped with two Kochers and and the superior rectus was sharply dissected off of the fascial edge. Following this the rectus muscles were  in the midline. The peritoneum was entered bluntly well superior to the bladder without any apparent injury and stretched bilaterally. At this point a hand was placed into the peritoneal cavity and the uterus was delivered through the incision. Numerous exophytic fibroids were palpated throughout the uterus including a large posterior fibroid abutting the cervix. The cornual insertion of both fallopian tubes was visualized.   At this time, in a sequential fashion dilute vasopressin was injected and then the bovie was used to incise the serosa overlying a fibroid and it was shelled out using a combination of blunt and sharp technique. This process was repeated multiple times throughout the surgery. Once the uterus was debulked of all fibroids it was confirmed that there was no apparent entry into the uterine cavity. During this process it was noted that accumulated blood losses were nearing 1 liter. A Hemacue read roughly Hb of 6 and the decision was made to transfuse 2 units of PRBC. At this time the defects in the myometrium were closed using a combination of 2 and 3-0 monocryl and vicryl. First to close any dead space then to reapproximate the myometrium. Finally the serosa was closed in a running locked stitch. This was repeated throughout the uterus. At this time when the uterus was fully closed and reconstructed it was inspected for bleeding. There was an area of what appeared to be a small non-expanding broad ligament hematoma on the right. Good hemostasis was assured, the uterine cavity was irrigated  and surgicel and interceed were placed over the uterine surface. At the 4 hour daniel a 2nd dose of antibiotics was given. Once the uterus was returned to the abdomien the fascia was closed with 0 Vicryl in a running fashion. Good hemostasis was assured. The skin was closed with a 4-0 monocryl subcuticular closure. The patient tolerated the procedure well. Sponge, lap, and needle counts were correct times three and the patient was transported to PACU in stable condition.     Claudean Peaches, MD  June 28, 2018  9:29 PM

## 2018-06-30 VITALS
RESPIRATION RATE: 18 BRPM | HEIGHT: 70 IN | TEMPERATURE: 97.8 F | OXYGEN SATURATION: 98 % | HEART RATE: 111 BPM | BODY MASS INDEX: 26.95 KG/M2 | DIASTOLIC BLOOD PRESSURE: 51 MMHG | WEIGHT: 188.25 LBS | SYSTOLIC BLOOD PRESSURE: 116 MMHG

## 2018-06-30 PROCEDURE — 74011250637 HC RX REV CODE- 250/637: Performed by: OBSTETRICS & GYNECOLOGY

## 2018-06-30 RX ADMIN — DOCUSATE SODIUM 100 MG: 100 CAPSULE, LIQUID FILLED ORAL at 15:07

## 2018-06-30 RX ADMIN — Medication 10 ML: at 13:09

## 2018-06-30 RX ADMIN — IBUPROFEN 600 MG: 600 TABLET ORAL at 15:07

## 2018-06-30 RX ADMIN — OXYCODONE HYDROCHLORIDE AND ACETAMINOPHEN 2 TABLET: 5; 325 TABLET ORAL at 00:55

## 2018-06-30 RX ADMIN — IBUPROFEN 600 MG: 600 TABLET ORAL at 08:35

## 2018-06-30 RX ADMIN — Medication 10 ML: at 06:15

## 2018-06-30 RX ADMIN — OXYCODONE HYDROCHLORIDE AND ACETAMINOPHEN 2 TABLET: 5; 325 TABLET ORAL at 13:09

## 2018-06-30 RX ADMIN — OXYCODONE HYDROCHLORIDE AND ACETAMINOPHEN 2 TABLET: 5; 325 TABLET ORAL at 06:15

## 2018-06-30 NOTE — PROGRESS NOTES
Pt's MEW score a 3 ,Dr. Marek Inman notified of Pt's Mew score. Patient is asymptomatic no new orders at this time will continue to monitor.

## 2018-06-30 NOTE — PROGRESS NOTES
Gynecology Post Operative Note    Nora Mario    Assessment: Doing well post-op    Plan: Discharge home today with: Medications: ibuprofen and percocet Follow up: as directed Diet: as tolerated Activity: per surgical instructions    Post-op day 2 from Procedure(s):  ABDOMINAL MYOMECTOMY   She is without significant complaints. Pain controlled on current medication. Voiding without difficulty. Patient is not passing flatus. She is is tolerating her diet. - pt is feeling well, tolerating PO, pain control improved  - is not yet passing flatus, +normal BS  - reviewed that I would like her to pass gas prior to discharge, so she will walk and see how it goes  - reviewed milk of mag for constipation and iron after has BM    Orders/Charges: Medium    Vitals:  Visit Vitals    /51 (BP 1 Location: Left arm, BP Patient Position: Post activity)    Pulse (!) 111    Temp 97.8 °F (36.6 °C)    Resp 18    Ht 5' 10\" (1.778 m)    Wt 85.4 kg (188 lb 4 oz)    LMP 06/10/2018 (Approximate)    SpO2 98%    BMI 27.01 kg/m2     Temp (24hrs), Av.6 °F (37 °C), Min:97.8 °F (36.6 °C), Max:99.7 °F (37.6 °C)      Last 24hr Input/Output:    Intake/Output Summary (Last 24 hours) at 18 0922  Last data filed at 18 0828   Gross per 24 hour   Intake                0 ml   Output              700 ml   Net             -700 ml          Exam:  Patient without distress. Abdomen soft, bowel sounds present, expected tenderness. Incision dry and clean without erythema. Lower extremities are negative for swelling, cords, or tenderness.     Labs: Lab Results   Component Value Date/Time    WBC 17.4 (H) 2018 05:08 AM    WBC 15.6 (H) 2018 08:27 PM    WBC 3.5 (L) 2018 09:09 AM    HGB 7.9 (L) 2018 05:08 AM    HGB 9.1 (L) 2018 08:27 PM    HGB 10.5 (L) 2018 09:09 AM    HCT 24.4 (L) 2018 05:08 AM    HCT 28.6 (L) 2018 08:27 PM    HCT 34.2 (L) 06/22/2018 09:09 AM    PLATELET 928 60/55/5388 05:08 AM    PLATELET 928 73/57/4638 08:27 PM    PLATELET 583 94/09/5055 09:09 AM       No results found for this or any previous visit (from the past 24 hour(s)).

## 2018-06-30 NOTE — PROGRESS NOTES
I have reviewed discharge instructions with the patient and parent. The patient and parent verbalized understanding. IVs removed. Patient passed gas.

## 2018-06-30 NOTE — PROGRESS NOTES
Bedside and Verbal shift change report given to [employer] (oncoming nurse) by Kathe Salter RN (offgoing nurse). Report included the following information SBAR, Kardex, OR Summary, Intake/Output, MAR and Accordion.

## 2018-06-30 NOTE — PROGRESS NOTES
Pt. Up ambulating in hallway with assistance when Pt. Became dizzy and nauseated . Pt. Assisted to wheelchair and then was transported back to bed. Pt. States she has a history of dizzy spells.

## 2018-07-02 ENCOUNTER — APPOINTMENT (OUTPATIENT)
Dept: CT IMAGING | Age: 33
End: 2018-07-02
Attending: PHYSICIAN ASSISTANT
Payer: COMMERCIAL

## 2018-07-02 ENCOUNTER — HOSPITAL ENCOUNTER (OUTPATIENT)
Age: 33
Setting detail: OBSERVATION
Discharge: HOME | End: 2018-07-03
Attending: STUDENT IN AN ORGANIZED HEALTH CARE EDUCATION/TRAINING PROGRAM | Admitting: OBSTETRICS & GYNECOLOGY
Payer: COMMERCIAL

## 2018-07-02 DIAGNOSIS — D64.9 SYMPTOMATIC ANEMIA: Primary | ICD-10-CM

## 2018-07-02 LAB
ABO + RH BLD: NORMAL
ALBUMIN SERPL-MCNC: 2.6 G/DL (ref 3.5–5)
ALBUMIN/GLOB SERPL: 0.6 {RATIO} (ref 1.1–2.2)
ALP SERPL-CCNC: 148 U/L (ref 45–117)
ALT SERPL-CCNC: 33 U/L (ref 12–78)
ANION GAP SERPL CALC-SCNC: 7 MMOL/L (ref 5–15)
APPEARANCE UR: CLEAR
AST SERPL-CCNC: 47 U/L (ref 15–37)
BACTERIA URNS QL MICRO: NEGATIVE /HPF
BASOPHILS # BLD: 0 K/UL (ref 0–0.1)
BASOPHILS NFR BLD: 0 % (ref 0–1)
BILIRUB SERPL-MCNC: 0.7 MG/DL (ref 0.2–1)
BILIRUB UR QL: NEGATIVE
BLD PROD TYP BPU: NORMAL
BLOOD GROUP ANTIBODIES SERPL: NORMAL
BPU ID: NORMAL
BUN SERPL-MCNC: 7 MG/DL (ref 6–20)
BUN/CREAT SERPL: 11 (ref 12–20)
CALCIUM SERPL-MCNC: 8.5 MG/DL (ref 8.5–10.1)
CHLORIDE SERPL-SCNC: 104 MMOL/L (ref 97–108)
CO2 SERPL-SCNC: 28 MMOL/L (ref 21–32)
COLOR UR: ABNORMAL
CREAT SERPL-MCNC: 0.64 MG/DL (ref 0.55–1.02)
CROSSMATCH RESULT,%XM: NORMAL
DIFFERENTIAL METHOD BLD: ABNORMAL
EOSINOPHIL # BLD: 0 K/UL (ref 0–0.4)
EOSINOPHIL NFR BLD: 0 % (ref 0–7)
EPITH CASTS URNS QL MICRO: ABNORMAL /LPF
ERYTHROCYTE [DISTWIDTH] IN BLOOD BY AUTOMATED COUNT: 17.8 % (ref 11.5–14.5)
GLOBULIN SER CALC-MCNC: 4.7 G/DL (ref 2–4)
GLUCOSE SERPL-MCNC: 90 MG/DL (ref 65–100)
GLUCOSE UR STRIP.AUTO-MCNC: NEGATIVE MG/DL
HCG UR QL: NEGATIVE
HCT VFR BLD AUTO: 21.6 % (ref 35–47)
HGB BLD-MCNC: 6.7 G/DL (ref 11.5–16)
HGB UR QL STRIP: ABNORMAL
HYALINE CASTS URNS QL MICRO: ABNORMAL /LPF (ref 0–5)
IMM GRANULOCYTES # BLD: 0.1 K/UL (ref 0–0.04)
IMM GRANULOCYTES NFR BLD AUTO: 1 % (ref 0–0.5)
KETONES UR QL STRIP.AUTO: NEGATIVE MG/DL
LEUKOCYTE ESTERASE UR QL STRIP.AUTO: ABNORMAL
LIPASE SERPL-CCNC: 54 U/L (ref 73–393)
LYMPHOCYTES # BLD: 1.5 K/UL (ref 0.8–3.5)
LYMPHOCYTES NFR BLD: 18 % (ref 12–49)
MCH RBC QN AUTO: 28.2 PG (ref 26–34)
MCHC RBC AUTO-ENTMCNC: 31 G/DL (ref 30–36.5)
MCV RBC AUTO: 90.8 FL (ref 80–99)
MONOCYTES # BLD: 0.7 K/UL (ref 0–1)
MONOCYTES NFR BLD: 8 % (ref 5–13)
NEUTS SEG # BLD: 6.2 K/UL (ref 1.8–8)
NEUTS SEG NFR BLD: 73 % (ref 32–75)
NITRITE UR QL STRIP.AUTO: NEGATIVE
NRBC # BLD: 0.06 K/UL (ref 0–0.01)
NRBC BLD-RTO: 0.7 PER 100 WBC
PH UR STRIP: 6 [PH] (ref 5–8)
PLATELET # BLD AUTO: 305 K/UL (ref 150–400)
PMV BLD AUTO: 11.7 FL (ref 8.9–12.9)
POTASSIUM SERPL-SCNC: 3.6 MMOL/L (ref 3.5–5.1)
PROT SERPL-MCNC: 7.3 G/DL (ref 6.4–8.2)
PROT UR STRIP-MCNC: NEGATIVE MG/DL
RBC # BLD AUTO: 2.38 M/UL (ref 3.8–5.2)
RBC #/AREA URNS HPF: >100 /HPF (ref 0–5)
RBC MORPH BLD: ABNORMAL
RBC MORPH BLD: ABNORMAL
SODIUM SERPL-SCNC: 139 MMOL/L (ref 136–145)
SP GR UR REFRACTOMETRY: 1.01 (ref 1–1.03)
SPECIMEN EXP DATE BLD: NORMAL
STATUS OF UNIT,%ST: NORMAL
UNIT DIVISION, %UDIV: 0
UROBILINOGEN UR QL STRIP.AUTO: 0.2 EU/DL (ref 0.2–1)
WBC # BLD AUTO: 8.5 K/UL (ref 3.6–11)
WBC URNS QL MICRO: ABNORMAL /HPF (ref 0–4)

## 2018-07-02 PROCEDURE — 71275 CT ANGIOGRAPHY CHEST: CPT

## 2018-07-02 PROCEDURE — 74011636320 HC RX REV CODE- 636/320: Performed by: STUDENT IN AN ORGANIZED HEALTH CARE EDUCATION/TRAINING PROGRAM

## 2018-07-02 PROCEDURE — 74011250637 HC RX REV CODE- 250/637: Performed by: OBSTETRICS & GYNECOLOGY

## 2018-07-02 PROCEDURE — 81025 URINE PREGNANCY TEST: CPT

## 2018-07-02 PROCEDURE — 70450 CT HEAD/BRAIN W/O DYE: CPT

## 2018-07-02 PROCEDURE — 74011250636 HC RX REV CODE- 250/636: Performed by: PHYSICIAN ASSISTANT

## 2018-07-02 PROCEDURE — 80053 COMPREHEN METABOLIC PANEL: CPT | Performed by: PHYSICIAN ASSISTANT

## 2018-07-02 PROCEDURE — 99284 EMERGENCY DEPT VISIT MOD MDM: CPT

## 2018-07-02 PROCEDURE — 36415 COLL VENOUS BLD VENIPUNCTURE: CPT | Performed by: PHYSICIAN ASSISTANT

## 2018-07-02 PROCEDURE — 96361 HYDRATE IV INFUSION ADD-ON: CPT

## 2018-07-02 PROCEDURE — 74011000258 HC RX REV CODE- 258: Performed by: STUDENT IN AN ORGANIZED HEALTH CARE EDUCATION/TRAINING PROGRAM

## 2018-07-02 PROCEDURE — 83690 ASSAY OF LIPASE: CPT | Performed by: PHYSICIAN ASSISTANT

## 2018-07-02 PROCEDURE — 96374 THER/PROPH/DIAG INJ IV PUSH: CPT

## 2018-07-02 PROCEDURE — P9016 RBC LEUKOCYTES REDUCED: HCPCS | Performed by: PHYSICIAN ASSISTANT

## 2018-07-02 PROCEDURE — 99218 HC RM OBSERVATION: CPT

## 2018-07-02 PROCEDURE — 36430 TRANSFUSION BLD/BLD COMPNT: CPT

## 2018-07-02 PROCEDURE — 85025 COMPLETE CBC W/AUTO DIFF WBC: CPT | Performed by: PHYSICIAN ASSISTANT

## 2018-07-02 PROCEDURE — 86923 COMPATIBILITY TEST ELECTRIC: CPT | Performed by: PHYSICIAN ASSISTANT

## 2018-07-02 PROCEDURE — 96375 TX/PRO/DX INJ NEW DRUG ADDON: CPT

## 2018-07-02 PROCEDURE — 86900 BLOOD TYPING SEROLOGIC ABO: CPT | Performed by: PHYSICIAN ASSISTANT

## 2018-07-02 PROCEDURE — 74011000250 HC RX REV CODE- 250: Performed by: PHYSICIAN ASSISTANT

## 2018-07-02 PROCEDURE — 81001 URINALYSIS AUTO W/SCOPE: CPT | Performed by: PHYSICIAN ASSISTANT

## 2018-07-02 RX ORDER — SODIUM CHLORIDE 0.9 % (FLUSH) 0.9 %
10 SYRINGE (ML) INJECTION
Status: COMPLETED | OUTPATIENT
Start: 2018-07-02 | End: 2018-07-02

## 2018-07-02 RX ORDER — SODIUM CHLORIDE 9 MG/ML
250 INJECTION, SOLUTION INTRAVENOUS AS NEEDED
Status: DISCONTINUED | OUTPATIENT
Start: 2018-07-02 | End: 2018-07-03 | Stop reason: HOSPADM

## 2018-07-02 RX ORDER — DIPHENHYDRAMINE HYDROCHLORIDE 50 MG/ML
25 INJECTION, SOLUTION INTRAMUSCULAR; INTRAVENOUS
Status: COMPLETED | OUTPATIENT
Start: 2018-07-02 | End: 2018-07-02

## 2018-07-02 RX ORDER — ONDANSETRON 4 MG/1
4 TABLET, ORALLY DISINTEGRATING ORAL
Status: DISCONTINUED | OUTPATIENT
Start: 2018-07-02 | End: 2018-07-03 | Stop reason: HOSPADM

## 2018-07-02 RX ORDER — PROCHLORPERAZINE EDISYLATE 5 MG/ML
10 INJECTION INTRAMUSCULAR; INTRAVENOUS
Status: DISCONTINUED | OUTPATIENT
Start: 2018-07-02 | End: 2018-07-02 | Stop reason: SDUPTHER

## 2018-07-02 RX ORDER — SODIUM CHLORIDE 9 MG/ML
1000 INJECTION, SOLUTION INTRAVENOUS CONTINUOUS
Status: DISCONTINUED | OUTPATIENT
Start: 2018-07-02 | End: 2018-07-02

## 2018-07-02 RX ORDER — IBUPROFEN 600 MG/1
600 TABLET ORAL
Status: DISCONTINUED | OUTPATIENT
Start: 2018-07-02 | End: 2018-07-03 | Stop reason: HOSPADM

## 2018-07-02 RX ORDER — SODIUM CHLORIDE 9 MG/ML
125 INJECTION, SOLUTION INTRAVENOUS CONTINUOUS
Status: DISCONTINUED | OUTPATIENT
Start: 2018-07-02 | End: 2018-07-03 | Stop reason: HOSPADM

## 2018-07-02 RX ORDER — DOCUSATE SODIUM 100 MG/1
100 CAPSULE, LIQUID FILLED ORAL
Status: DISCONTINUED | OUTPATIENT
Start: 2018-07-02 | End: 2018-07-03 | Stop reason: HOSPADM

## 2018-07-02 RX ORDER — DIPHENHYDRAMINE HCL 25 MG
25 CAPSULE ORAL
Status: DISCONTINUED | OUTPATIENT
Start: 2018-07-02 | End: 2018-07-03 | Stop reason: HOSPADM

## 2018-07-02 RX ORDER — ONDANSETRON 2 MG/ML
4 INJECTION INTRAMUSCULAR; INTRAVENOUS
Status: COMPLETED | OUTPATIENT
Start: 2018-07-02 | End: 2018-07-02

## 2018-07-02 RX ORDER — KETOROLAC TROMETHAMINE 30 MG/ML
30 INJECTION, SOLUTION INTRAMUSCULAR; INTRAVENOUS
Status: COMPLETED | OUTPATIENT
Start: 2018-07-02 | End: 2018-07-02

## 2018-07-02 RX ORDER — OXYCODONE AND ACETAMINOPHEN 10; 325 MG/1; MG/1
1 TABLET ORAL
Status: DISCONTINUED | OUTPATIENT
Start: 2018-07-02 | End: 2018-07-03 | Stop reason: HOSPADM

## 2018-07-02 RX ADMIN — PROCHLORPERAZINE EDISYLATE 10 MG: 5 INJECTION INTRAMUSCULAR; INTRAVENOUS at 16:22

## 2018-07-02 RX ADMIN — ONDANSETRON 4 MG: 2 INJECTION INTRAMUSCULAR; INTRAVENOUS at 16:07

## 2018-07-02 RX ADMIN — IOPAMIDOL 75 ML: 755 INJECTION, SOLUTION INTRAVENOUS at 17:06

## 2018-07-02 RX ADMIN — FAMOTIDINE 20 MG: 10 INJECTION, SOLUTION INTRAVENOUS at 16:07

## 2018-07-02 RX ADMIN — Medication 10 ML: at 17:06

## 2018-07-02 RX ADMIN — KETOROLAC TROMETHAMINE 30 MG: 30 INJECTION INTRAMUSCULAR; INTRAVENOUS at 16:21

## 2018-07-02 RX ADMIN — SODIUM CHLORIDE 1000 ML: 900 INJECTION, SOLUTION INTRAVENOUS at 16:06

## 2018-07-02 RX ADMIN — OXYCODONE HYDROCHLORIDE AND ACETAMINOPHEN 1 TABLET: 10; 325 TABLET ORAL at 21:42

## 2018-07-02 RX ADMIN — DIPHENHYDRAMINE HYDROCHLORIDE 25 MG: 50 INJECTION, SOLUTION INTRAMUSCULAR; INTRAVENOUS at 16:22

## 2018-07-02 RX ADMIN — SODIUM CHLORIDE 100 ML: 900 INJECTION, SOLUTION INTRAVENOUS at 17:06

## 2018-07-02 NOTE — ED PROVIDER NOTES
HPI Comments: 26 y.o. female with past medical history significant for fibroids (4 days post op from myomectomy) and anemia who presents with chief complaint of headache. Patient complains of 4 days of constant \"throbbing\" headache with associated nausea. Patient had the headache prior to discharge from the hospital 2 days ago. Patient states currently headache is posterior and on both sides, but last night it was mainly frontal.  Patient has a history of migraines but states this feels different; \"it feels like my teeth and mouth hurt too. \"  Patient currently rates pain 6-7/10 and notes headaches are keeping her from sleeping. Patient has been taking Percocet and ibuprofen with no relief. Patient also complains of intermittent shortness of breath and dizziness over the past couple days; shortness of breath is worse with exertion. She denies any pain with breathing. Patient reports light vaginal bleeding. Patient's first BM post op was today. Patient denies back pain, abdominal pain, urinary symptoms, extremity numbness/tingling, cough, congestion, rhinorrhea. There are no other acute medical concerns at this time. PCP: Avelina Bruce MD     Note written by Shara Solomon, as dictated by Jacqueline Treadwell PA-C 4:07 PM      The history is provided by the patient. Past Medical History:   Diagnosis Date    Anemia     Fibroids 2018    GERD (gastroesophageal reflux disease)        Past Surgical History:   Procedure Laterality Date    HX HEENT      WISDOM TEETH REMOVED         Family History:   Problem Relation Age of Onset    Other Mother      Joan Josephcual Baos Anesth Problems Neg Hx        Social History     Social History    Marital status: SINGLE     Spouse name: N/A    Number of children: N/A    Years of education: N/A     Occupational History    Not on file.      Social History Main Topics    Smoking status: Never Smoker    Smokeless tobacco: Never Used    Alcohol use Yes Comment: OCCASIONALLY    Drug use: Yes     Special: Marijuana      Comment: LAST 7-20-18    Sexual activity: Not on file     Other Topics Concern    Not on file     Social History Narrative         ALLERGIES: Latex    Review of Systems   Constitutional: Negative. Negative for chills, fatigue and fever. HENT: Negative. Negative for congestion, ear pain, facial swelling, rhinorrhea, sneezing and sore throat. Eyes: Negative for pain, discharge and itching. Respiratory: Positive for shortness of breath. Negative for cough and chest tightness. Cardiovascular: Negative. Negative for chest pain and leg swelling. Gastrointestinal: Positive for nausea. Negative for abdominal distention, abdominal pain, constipation, diarrhea and vomiting. Genitourinary: Negative for difficulty urinating, frequency and urgency. Musculoskeletal: Negative for arthralgias, back pain, joint swelling, neck pain and neck stiffness. Skin: Negative for color change and rash. Neurological: Positive for dizziness and headaches. Negative for numbness. Psychiatric/Behavioral: Negative for confusion and decreased concentration. All other systems reviewed and are negative. Vitals:    07/02/18 1516   BP: 123/63   Pulse: 77   Resp: 15   Temp: 98.1 °F (36.7 °C)   SpO2: 100%   Weight: 80.2 kg (176 lb 12.8 oz)   Height: 5' 10\" (1.778 m)            Physical Exam   Constitutional: She is oriented to person, place, and time. She appears well-developed and well-nourished. No distress. Well appearing AAF pleasant in NAD   HENT:   Head: Normocephalic and atraumatic. Right Ear: External ear normal.   Left Ear: External ear normal.   Nose: Nose normal.   Mouth/Throat: Oropharynx is clear and moist.   Eyes: Conjunctivae and EOM are normal. Pupils are equal, round, and reactive to light. Right eye exhibits no discharge. Left eye exhibits no discharge. Neck: Normal range of motion. Neck supple.    Cardiovascular: Normal rate, regular rhythm, normal heart sounds and intact distal pulses. Pulmonary/Chest: Effort normal and breath sounds normal.   Abdominal: Soft. Bowel sounds are normal. She exhibits no distension. There is no tenderness. There is no rebound and no guarding. Musculoskeletal: Normal range of motion. She exhibits no edema. Left wrist: She exhibits normal range of motion, no swelling, no crepitus and no deformity. Neurological: She is alert and oriented to person, place, and time. No cranial nerve deficit. Coordination normal.   Skin: Skin is warm and dry. No rash noted. Psychiatric: She has a normal mood and affect. Her behavior is normal. Judgment and thought content normal.   Nursing note and vitals reviewed. Note written by Ernesto Labrador. Pietro Lesches, as dictated by Jacqueline Treadwell PA-C 4:07 PM     MDM  Number of Diagnoses or Management Options  Symptomatic anemia:   Diagnosis management comments: 25 yo AAF s/p myomectomy 5 days ago with SOB and headache. Hx od anemia. Concern for PE vs worsening anemia amongst others  Plan  CBC  CMP  CT head  CtA  IVF  Analgesia  Compazine  Benadryl  Reassess. Brandi Sepulveda         Amount and/or Complexity of Data Reviewed  Clinical lab tests: ordered and reviewed  Tests in the radiology section of CPT®: ordered and reviewed  Independent visualization of images, tracings, or specimens: yes          ED Course       Procedures    Progress note    Labs and imaging reviewed. Hgb 6.7 decreased from 7.9 g/dL. Likely etiology of headache and SOb. Brandi Stratton    Spoke with Dr. Vivian Mendez, discussed HPI, PE findings, labs and imaging. She agrees to eval patient for admission.  Brandi Arreola

## 2018-07-02 NOTE — ROUTINE PROCESS
TRANSFER - OUT REPORT:    Verbal report given to Evelyn Decker RN (name) on Rivera Lo  being transferred to [facility] (unit) for routine progression of care       Report consisted of patients Situation, Background, Assessment and   Recommendations(SBAR). Information from the following report(s) SBAR, ED Summary, Recent Results and Cardiac Rhythm NSR was reviewed with the receiving nurse. Lines:   Peripheral IV 07/02/18 Left Antecubital (Active)   Site Assessment Clean, dry, & intact 7/2/2018  3:41 PM   Phlebitis Assessment 0 7/2/2018  3:41 PM   Infiltration Assessment 0 7/2/2018  3:41 PM   Dressing Status Clean, dry, & intact 7/2/2018  3:41 PM   Dressing Type Transparent 7/2/2018  3:41 PM   Hub Color/Line Status Pink;Flushed;Patent 7/2/2018  3:41 PM   Action Taken Blood drawn 7/2/2018  3:41 PM        Opportunity for questions and clarification was provided. Patient transported with Transport.

## 2018-07-02 NOTE — IP AVS SNAPSHOT
Summary of Care Report The Summary of Care report has been created to help improve care coordination. Users with access to [facility] [facility] or [address] (Web-based application) may access additional patient information including the Discharge Summary. If you are not currently a [address] user and need more information, please call the number listed below in the [place] [address] section and ask to be connected with Medical Records. Facility Information Name Address Phone [address] [place] [phone] Patient Information Patient Name Sex ALISHA Jimenez ([id number]) Female 1985 Discharge Information Admitting Provider Service Area Unit Alonzo Anne MD / [address] Unit / [phone] Discharge Provider Discharge Date/Time Discharge Disposition Destination (none) 7/3/2018 (Pending) AHR (none) Hospital Problems as of 7/3/2018  Never Reviewed Class Noted - Resolved Last Modified POA Active Problems Anemia  Unknown - Present 7/3/2018 by Hui Mendoza MD Yes Entered by Lin Dennison CRNA Non-Hospital Problems as of 7/3/2018  Never Reviewed Class Noted - Resolved Last Modified Active Problems Fibroid  2018 - Present 2018 by Hui Mendoza MD  
  Entered by Hui Mendoza MD  
  
You are allergic to the following Allergen Reactions Latex Rash Current Discharge Medication List  
  
CONTINUE these medications which have NOT CHANGED Dose & Instructions Dispensing Information Comments  
 ibuprofen 600 mg tablet Commonly known as:  MOTRIN Dose:  600 mg Take 1 Tab by mouth every six (6) hours as needed for Pain. Quantity:  30 Tab Refills:  1 Iron 325 mg (65 mg iron) tablet Generic drug:  ferrous sulfate Take  by mouth daily. Refills:  0  
   
 multivitamin tablet Commonly known as:  ONE A DAY Dose:  1 Tab Take 1 Tab by mouth Every Tues, Thur & Sat. Refills:  0  
   
 oxyCODONE-acetaminophen 5-325 mg per tablet Commonly known as:  PERCOCET Dose:  1 Tab Take 1 Tab by mouth every four (4) hours as needed. Max Daily Amount: 6 Tabs. Quantity:  28 Tab Refills:  0 STOP taking these medications Comments JUNEL 1/20 (21) 1-20 mg-mcg Tab Generic drug:  norethindrone-ethinyl estradiol Follow-up Information Follow up With Details Comments Contact Info Avelina Bruce MD   Patient can only remember the practice name and not the physician Dulce Maria Ortiz MD In 1 week  [facility] [address] [place] [address] 
[phone] Discharge Instructions Learning About Blood Transfusions What is a blood transfusion? Blood transfusion is a medical treatment to replace the blood or parts of blood that your body has lost. The blood goes through a tube from a bag to an intravenous (IV) catheter and into your vein. You may need a blood transfusion after losing blood from an injury, a major surgery, an illness that causes bleeding, or an illness that destroys blood cells. Transfusions are also used to give you the parts of blood-such as platelets, plasma, or substances that cause clotting-that your body needs to fight an illness or stop bleeding. How is a blood transfusion done? Before you receive a blood transfusion, your blood is tested to find out what your blood type is. Blood or blood parts that are a match with your blood type are ordered by your doctor. Blood is typed as A, B, AB, or O. It is also typed as Rh-positive or Rh-negative. Your blood is also screened to look for antibodies that might react with the blood that is given to you.  The blood you are getting is checked and rechecked to make sure that it's the right type for you. A sample of your blood is mixed with a sample of the blood you will receive to check for problems. Before actually giving you the transfusion, a doctor and nurses will look at the label on the package of blood and compare it to your hospital ID bracelet and medical records. The transfusion begins only when all agree that this is the correct blood and that you are the correct person to receive it. To receive the transfusion, you will have an intravenous (IV) catheter inserted into a vein. A tube connects the catheter to the bag containing the blood, which is placed higher than your body. The blood then flows slowly into your vein. A doctor or nurse will check you several times during the transfusion to watch for a reaction or other problems. What are the possible risks? Blood transfusions have many benefits and are often life-saving. But they also have a few risks. Possible risks include: 
· Your body's reaction to receiving new blood. This may include: ¨ Fever. ¨ Allergic reactions. ¨ Breathing problems. · An infection from the blood. This risk is small because of the strict rules placed on handling and storing blood. Getting a viral infection, such as HIV or hepatitis B or C, through blood transfusions has become very rare. The U.S. Food and Drug Administration (FDA) enforces strict guidelines on the collection, testing, storage, and use of blood. · Getting the wrong blood type by accident. Severe reactions, which can be life-threatening, are very rare. What can you expect after a blood transfusion? Here are some things you can do at home to help prevent infection at the transfusion site: 
· Wash the area daily with warm, soapy water, and pat it dry. Don't use hydrogen peroxide or alcohol, which can slow healing. You may cover the area with a gauze bandage if it weeps or rubs against clothing. Change the bandage every day. · Keep the area clean and dry. When should you call for help? Call 911 anytime you think you may need emergency care. For example, call if: 
· You have severe trouble breathing. Call your doctor now or seek immediate medical care if: 
· You have a fever. · You feel weaker or more tired than usual. 
· You have a yellow tint to your skin or the whites of your eyes. Watch closely for changes in your health, and be sure to contact your doctor if you have any problems. Follow-up care is a key part of your treatment and safety. Be sure to make and go to all appointments, and call your doctor if you are having problems. It's also a good idea to know your test results and keep a list of the medicines you take. Where can you learn more? Go to http://kennedy-orly.info/. Enter V588 in the search box to learn more about \"Learning About Blood Transfusions. \" Current as of: October 13, 2016 Content Version: 11.4 © 8411-9766 [employer]. Care instructions adapted under license by [employer] (which disclaims liability or warranty for this information). If you have questions about a medical condition or this instruction, always ask your healthcare professional. [place] [address] any warranty or liability for your use of this information. Anemia: Care Instructions Your Care Instructions Anemia is a low level of red blood cells, which carry oxygen throughout your body. Many things can cause anemia. Lack of iron is one of the most common causes. Your body needs iron to make hemoglobin, a substance in red blood cells that carries oxygen from the lungs to your body's cells. Without enough iron, the body produces fewer and smaller red blood cells. As a result, your body's cells do not get enough oxygen, and you feel tired and weak. And you may have trouble concentrating. Bleeding is the most common cause of a lack of iron.  You may have heavy menstrual bleeding or bleeding caused by conditions such as ulcers, hemorrhoids, or cancer. Regular use of aspirin or other anti-inflammatory medicines (such as ibuprofen) also can cause bleeding in some people. A lack of iron in your diet also can cause anemia, especially at times when the body needs more iron, such as during pregnancy, infancy, and the teen years. Your doctor may have prescribed iron pills. It may take several months of treatment for your iron levels to return to normal. Your doctor also may suggest that you eat foods that are rich in iron, such as meat and beans. There are many other causes of anemia. It is not always due to a lack of iron. Finding the specific cause of your anemia will help your doctor find the right treatment for you. Follow-up care is a key part of your treatment and safety. Be sure to make and go to all appointments, and call your doctor if you are having problems. It's also a good idea to know your test results and keep a list of the medicines you take. How can you care for yourself at home? · Take your medicines exactly as prescribed. Call your doctor if you think you are having a problem with your medicine. · If your doctor recommends iron pills, take them as directed: ¨ Try to take the pills on an empty stomach about 1 hour before or 2 hours after meals. But you may need to take iron with food to avoid an upset stomach. ¨ Do not take antacids or drink milk or caffeine drinks (such as coffee, tea, or cola) at the same time or within 2 hours of the time that you take your iron. They can make it hard for your body to absorb the iron. ¨ Vitamin C (from food or supplements) helps your body absorb iron. Try taking iron pills with a glass of orange juice or some other food that is high in vitamin C, such as citrus fruits. ¨ Iron pills may cause stomach problems, such as heartburn, nausea, diarrhea, constipation, and cramps.  Be sure to drink plenty of fluids, and include fruits, vegetables, and fiber in your diet each day. Iron pills often make your bowel movements dark or green. ¨ If you forget to take an iron pill, do not take a double dose of iron the next time you take a pill. ¨ Keep iron pills out of the reach of small children. An overdose of iron can be very dangerous. · Follow your doctor's advice about eating iron-rich foods. These include red meat, shellfish, poultry, eggs, beans, raisins, whole-grain bread, and leafy green vegetables. · Steam vegetables to help them keep their iron content. When should you call for help? Call 911 anytime you think you may need emergency care. For example, call if: 
? · You have symptoms of a heart attack. These may include: ¨ Chest pain or pressure, or a strange feeling in the chest. 
¨ Sweating. ¨ Shortness of breath. ¨ Nausea or vomiting. ¨ Pain, pressure, or a strange feeling in the back, neck, jaw, or upper belly or in one or both shoulders or arms. ¨ Lightheadedness or sudden weakness. ¨ A fast or irregular heartbeat. After you call 911, the  may tell you to chew 1 adult-strength or 2 to 4 low-dose aspirin. Wait for an ambulance. Do not try to drive yourself. ? · You passed out (lost consciousness). ?Call your doctor now or seek immediate medical care if: 
? · You have new or increased shortness of breath. ? · You are dizzy or lightheaded, or you feel like you may faint. ? · Your fatigue and weakness continue or get worse. ? · You have any abnormal bleeding, such as: 
¨ Nosebleeds. ¨ Vaginal bleeding that is different (heavier, more frequent, at a different time of the month) than what you are used to. ¨ Bloody or black stools, or rectal bleeding. ¨ Bloody or pink urine. ? Watch closely for changes in your health, and be sure to contact your doctor if: 
? · You do not get better as expected. Where can you learn more? Go to http://yadira.info/. Enter R301 in the search box to learn more about \"Anemia: Care Instructions. \" Current as of: October 13, 2016 Content Version: 11.4 © 0774-2673 [employer]. Care instructions adapted under license by [employer] (which disclaims liability or warranty for this information). If you have questions about a medical condition or this instruction, always ask your healthcare professional. [address] any warranty or liability for your use of this information. Abdominal Myomectomy: What to Expect at [facility] Your Recovery You can expect to feel better and stronger each day, although you may tire quickly and need pain medicine for a week or two. You may need about 4 to 6 weeks to fully recover. Do not lift anything heavy while you are recovering so that your incision and your belly muscles can heal. 
This care sheet gives you a general idea about how long it will take for you to recover. But each person recovers at a different pace. Follow the steps below to get better as quickly as possible. How can you care for yourself at home? Activity ? · Rest when you feel tired. Getting enough sleep will help you recover. ? · Try to walk each day. Start out by walking a little more than you did the day before. Bit by bit, increase the amount you walk. Walking boosts blood flow and helps prevent pneumonia and constipation. ? · For 4 to 6 weeks, avoid lifting anything that would make you strain. This may include a child, heavy grocery bags and milk containers, a heavy briefcase or backpack, cat litter or dog food bags, or a vacuum . ? · Avoid strenuous activities, such as biking, jogging, weightlifting, and aerobic exercise, for 4 to 6 weeks. ? · You may shower. Pat the incision dry when you are done. Do not take a bath for the first week after surgery or until your doctor tells you it is okay. ? · You may have some light vaginal bleeding.  Wear sanitary pads if needed. Do not douche or use tampons. ? · Ask your doctor when you can drive again. ? · You will probably need to take 2 to 4 weeks off work. It depends on the type of work you do and how you feel. ? · Do not have sex until your doctor tells you it is okay. ? · Talk about birth control with your doctor. Do not try to become pregnant until your doctor says it is okay. Diet ? · You can eat your normal diet. If your stomach is upset, try bland, low-fat foods like plain rice, broiled chicken, toast, and yogurt. ? · Drink plenty of fluids (unless your doctor tells you not to). ? · You may notice that your bowel movements are not regular right after your surgery. This is common. Try to avoid constipation and straining with bowel movements. You may want to take a fiber supplement every day. If you have not had a bowel movement after a couple of days, ask your doctor about taking a mild laxative. Medicines ? · Your doctor will tell you if and when you can restart your medicines. He or she will also give you instructions about taking any new medicines. ? · If you take blood thinners, such as warfarin (Coumadin), clopidogrel (Plavix), or aspirin, be sure to talk to your doctor. He or she will tell you if and when to start taking those medicines again. Make sure that you understand exactly what your doctor wants you to do. ? · Take pain medicines exactly as directed. ¨ If the doctor gave you a prescription medicine for pain, take it as prescribed. ¨ If you are not taking a prescription pain medicine, take an over-the-counter medicine such as acetaminophen (Tylenol), ibuprofen (Advil, Motrin), or naproxen (Aleve). Read and follow all instructions on the label. Do not take two or more pain medicines at the same time unless the doctor told you to. Many pain medicines contain acetaminophen, which is Tylenol. Too much acetaminophen (Tylenol) can be harmful. ? · If you think your pain medicine is making you sick to your stomach: 
¨ Take your medicine after meals (unless your doctor tells you not to). ¨ Ask your doctor for a different pain medicine. ? · If your doctor prescribed antibiotics, take them as directed. Do not stop taking them just because you feel better. You need to take the full course of antibiotics. Incision care ? · If you have strips of tape on the cut (incision) the doctor made, leave the tape on for a week or until it falls off.  
? · Wash the area daily with warm, soapy water and pat it dry. ? · Keep the area clean and dry. You may cover it with a gauze bandage if it weeps or rubs against clothing. Change the bandage every day. Other instructions ? · Wear loose, comfortable clothing and avoid anything that puts pressure on your belly for a few weeks. Follow-up care is a key part of your treatment and safety. Be sure to make and go to all appointments, and call your doctor if you are having problems. It's also a good idea to know your test results and keep a list of the medicines you take. When should you call for help? Call 911 anytime you think you may need emergency care. For example, call if: 
? · You passed out (lost consciousness). ? · You have chest pain, are short of breath, or cough up blood. ?Call your doctor now or seek immediate medical care if: 
? · You have pain that does not get better after you take pain medicine. ? · You cannot pass stools or gas. ? · You have vaginal discharge that has increased in amount or smells bad.  
? · You are sick to your stomach or cannot drink fluids. ? · You have loose stitches, or your incision comes open. ? · Bright red blood has soaked through the bandage over your incision. ? · You have signs of infection, such as: 
¨ Increased pain, swelling, warmth, or redness. ¨ Red streaks leading from the incision. ¨ Pus draining from the incision. ¨ A fever. ? · You have bright red vaginal bleeding that soaks one or more pads in an hour, or you have large clots. ? · You have signs of a blood clot in your leg (called a deep vein thrombosis), such as: 
¨ Pain in your calf, back of the knee, thigh, or groin. ¨ Redness and swelling in your leg or groin. ? Watch closely for any changes in your health, and be sure to contact your doctor if you have any problems. Where can you learn more? Go to http://kennedy-orly.info/. Enter X553 in the search box to learn more about \"Abdominal Myomectomy: What to Expect at Home. \" Current as of: October 13, 2016 Content Version: 11.4 © 0538-6955 [employer]. Care instructions adapted under license by [employer] (which disclaims liability or warranty for this information). If you have questions about a medical condition or this instruction, always ask your healthcare professional. [place] [address] any warranty or liability for your use of this information. Chart Review Routing History No Routing History on File

## 2018-07-02 NOTE — PROGRESS NOTES
Date of previous inpatient admission/ ED visit? 6/28/18-6/30/18   Abdominal Myomectomy    What brought the patient back to ED? Headache, shortness of breath    Did patient decline recommended services during last admission/ ED visit (if yes, what)? No    Has patient seen a provider since their last inpatient admission/ED visit (if yes, when)? No    CM Interventions:    Care Management Interventions  PCP Verified by CM: No  Palliative Care Criteria Met (RRAT>21 & CHF Dx)?: No  Transition of Care Consult (CM Consult): Other (CM Readmission:  RRAT    6/28/18-6/30/18)  MyChart Signup: No  Discharge Durable Medical Equipment: No  Health Maintenance Reviewed: Yes  Physical Therapy Consult: No  Occupational Therapy Consult: No  Speech Therapy Consult: No  Current Support Network: Own Home (Lives alone, independent)  Plan discussed with Pt/Family/Caregiver: Yes (Met with patient and her mother at bedside. Anticipate discharge to home with no services.)  Discharge Location  Discharge Placement: [facility] is a 26year old female to [address] [place] [address] ED with complaints of shortness of breath and headache. S/p abdominal myomectomy. Verified face sheet and demographics. Met with patient in ED, her mother by her stretcher. She has been staying with her mother  Since her surgery.       Nikki Echeverria, CRM

## 2018-07-02 NOTE — ED NOTES

## 2018-07-02 NOTE — H&P
CC:  HA and SOB. History of Present Illness:  27yo G0 POD4 s/p abdominal myomectomy by Dr. Suzy Pickard who presented to the ED tonight with c/o severe HA and SOB with LANG. She notes taking her percocet/ibuprofen regularly but has had no relief from the HA. The intensity is so bad it has kept her from sleeping for the past few nights. Also c/o LANG since surgery. Hgb on discharge 7.9, no transfusion given post-op although did receive 2u intra-op due to EBL 1000cc. Notes since being home incisional/abdominal pain has been manageable and vaginal bleeding has been light. Denies F/C, CP, bowel/bladder dysfunction. In ED Head CT neg, Chest CT neg aside from mild bilateral atelectasis. Hgb 6.7, o/w normal labs. Also received compazine and toradol and states HA pain is now improved. Vital Signs   26Years Old Female  BP:       123/63    Past Pregnancy History   : 0  Para:     0  Aborta:  0  Term: 0, Premature: 0, Living Children: 0, Vaginal Deliveries: 0, C-Sections: 0, Elect. Ab: 0, Ectopics: 0    Gynecologic History   History of abnormal pap: no  Gardasil Injection History: Complete  Pt currently sexually active: no  Pt ever sexually active: yes  Current Contraception: Oral Contraception. Junel FE  History of STD: yes   STD Type: HPV.         Serial Vital Signs/Assessments:    Time      Position  BP       Pulse  Resp  Temp     By                      123/63   77           98.1  F  Shreya Goodwin MD     Allergies    LATEX (DISPOSABLE GLOVES) (Mild)      Medications Removed from Medication List          Past Medical History:     Reviewed history from 2018 and no changes required:        Fibroids -     Past Surgical History:     Reviewed history from 2018 and no changes required:        [place] Te[facility]        Abdominal myomectomy 18 Marge Garrison    Family History Summary:      Reviewed history Last on 2018 and no changes required:2018      General Comments - FH:  and dementia    Social History:     Reviewed history from 2018 and no changes required:        [address] [employer]        Works for [employer]        Past Pregnancy History      :  0     Term Births:  0     Premature Births: 0     Living Children: 0     Para:   0     Prev : 0     Aborta:  0     Elect. Ab:  0     Spont. Ab:  0     Ectopics:  0      Review of Systems        See HPI    Except as noted in the HPI, the review of systems is negative for General, Breast, , Resp, GI, Endo, MS, Psych and Heme. General Medical Physical Exam:     General Appearance:       well developed, well nourished, in no acute distress    Head:        Inspection:  normocephalic without obvious abnormalities    Eyes:        External:  EOM intact    Ears, Nose, Throat:        External:  normocephalic and atraumatic    Respiratory:        Resp. effort:  no use of accessory muscles    Cardiovascular:       Peripheral circ: no cyanosis, clubbing, or edema    Gastrointestinal:        Abdomen:  soft, minimally/appropriately TTP, pfannensteil healing well without erythema, induration, drainage    Genitourinary:        deferred    Skin:        Inspection:  no rashes, suspicious lesions, or ulcerations    Psychiatric:       Judgement:  intact       Orientation:  oriented to time, place, and person              Impression & Recommendations:    Problem # 1:  Anemia (non-screening) (ICD-285.9) ([id number])  POD4 s/p abdominal myomectomy which required 2u PRBCS intra-op due to EBL 1L, now with symptomatic anemia (hgb 6.7)  currently no evidence of active/acute bleeding and VS are stable  reviewed risks/benefits of blood transfusion, pt accepts and desires to proceed - 2u PRBCs to be given  repeat CBC in am  cont NS at 125 overnight    Problem # 2:  Headache (ICD-784.0) ([id number])  responded well to toradol/compazine in ED  suspect maybe related to anemia/dehydration - transfusion and IVF hydration as above  PO pain meds and prn compazine    Problem # 3:  Post op surgical follow up (ICD-V67.00) ([id number])  routine post-op care  PO pain meds  enc ambulation    Other Orders:  Inpatient Admission - High (CPT-AdmitF)    Medications (at conclusion of this visit)    06/22/2018 IRON TABLET (FERROUS GLUCONATE TABS) Prescribed by non 606/70Dontrell Godinez MD  03/26/2018 JUNEL FE 24 1-20 MG-MCG(24) ORAL TABLET (Loras Sprout ESTRAD-FE) Prescribed by non 606/Tammie Godinez MD          Electronically signed by Carole Quinones MD  on 07/02/2018 at 6:59 PM    ________________________________________________________________________

## 2018-07-02 NOTE — ED NOTES
SBAR report received from [facility]. Assumed care of patient at this time. Patient resting comfortably in bed, bed locked & low, call bell within reach.

## 2018-07-02 NOTE — IP AVS SNAPSHOT
[address] [facility] [address] 
[phone] Patient: Aleksey Cooper MRN: [id number] [id number] About your hospitalization You were admitted on:  July 2, 2018 You last received care in the:  [address] [place] [address] You were discharged on:  July 3, 2018 Why you were hospitalized Your primary diagnosis was:  Not on File Your diagnoses also included:  Anemia Follow-up Information Follow up With Details Comments Contact Info Avelina Bruce MD   Patient can only remember the practice name and not the physician Vlad Huerta MD In 1 week  [facility] [address] 
[phone] Discharge Orders None A check daniel indicates which time of day the medication should be taken. My Medications CONTINUE taking these medications Instructions Each Dose to Equal  
 Morning Noon Evening Bedtime  
 ibuprofen 600 mg tablet Commonly known as:  MOTRIN Your last dose was: Your next dose is: Take 1 Tab by mouth every six (6) hours as needed for Pain. 600 mg Iron 325 mg (65 mg iron) tablet Generic drug:  ferrous sulfate Your last dose was: Your next dose is: Take  by mouth daily. multivitamin tablet Commonly known as:  ONE A DAY Your last dose was: Your next dose is: Take 1 Tab by mouth Every Tues, Thur & Sat.  
 1 Tab  
    
   
   
   
  
 oxyCODONE-acetaminophen 5-325 mg per tablet Commonly known as:  PERCOCET Your last dose was: Your next dose is: Take 1 Tab by mouth every four (4) hours as needed. Max Daily Amount: 6 Tabs. 1 Tab STOP taking these medications JUNEL 1/20 (21) 1-20 mg-mcg Tab Generic drug:  norethindrone-ethinyl estradiol Opioid Education Prescription Opioids: What You Need to Know: 
 
Prescription opioids can be used to help relieve moderate-to-severe pain and are often prescribed following a surgery or injury, or for certain health conditions. These medications can be an important part of treatment but also come with serious risks. Opioids are strong pain medicines. Examples include hydrocodone, oxycodone, fentanyl, and morphine. Heroin is an example of an illegal opioid. It is important to work with your health care provider to make sure you are getting the safest, most effective care. WHAT ARE THE RISKS AND SIDE EFFECTS OF OPIOID USE? Prescription opioids carry serious risks of addiction and overdose, especially with prolonged use. An opioid overdose, often marked by slow breathing, can cause sudden death. The use of prescription opioids can have a number of side effects as well, even when taken as directed. · Tolerance-meaning you might need to take more of a medication for the same pain relief · Physical dependence-meaning you have symptoms of withdrawal when the medication is stopped. Withdrawal symptoms can include nausea, sweating, chills, diarrhea, stomach cramps, and muscle aches. Withdrawal can last up to several weeks, depending on which drug you took and how long you took it. · Increased sensitivity to pain · Constipation · Nausea, vomiting, and dry mouth · Sleepiness and dizziness · Confusion · Depression · Low levels of testosterone that can result in lower sex drive, energy, and strength · Itching and sweating RISKS ARE GREATER WITH:      
· History of drug misuse, substance use disorder, or overdose · Mental health conditions (such as depression or anxiety) · Sleep apnea · Older age (72 years or older) · Pregnancy Avoid alcohol while taking prescription opioids. Also, unless specifically advised by your health care provider, medications to avoid include: · Benzodiazepines (such as Xanax or Valium) · Muscle relaxants (such as Soma or Flexeril) · Hypnotics (such as Ambien or Lunesta) · Other prescription opioids KNOW YOUR OPTIONS Talk to your health care provider about ways to manage your pain that don't involve prescription opioids. Some of these options may actually work better and have fewer risks and side effects. Options may include: 
· Pain relievers such as acetaminophen, ibuprofen, and naproxen · Some medications that are also used for depression or seizures · Physical therapy and exercise · Counseling to help patients learn how to cope better with triggers of pain and stress. · Application of heat or cold compress · Massage therapy · Relaxation techniques Be Informed Make sure you know the name of your medication, how much and how often to take it, and its potential risks & side effects. IF YOU ARE PRESCRIBED OPIOIDS FOR PAIN: 
· Never take opioids in greater amounts or more often than prescribed. Remember the goal is not to be pain-free but to manage your pain at a tolerable level. · Follow up with your primary care provider to: · Work together to create a plan on how to manage your pain. · Talk about ways to help manage your pain that don't involve prescription opioids. · Talk about any and all concerns and side effects. · Help prevent misuse and abuse. · Never sell or share prescription opioids · Help prevent misuse and abuse. · Store prescription opioids in a secure place and out of reach of others (this may include visitors, children, friends, and family). · Safely dispose of unused/unwanted prescription opioids: Find your community drug take-back program or your pharmacy mail-back program, or flush them down the toilet, following guidance from the Food and Drug Administration (www.fda.gov/Drugs/ResourcesForYou). · Visit www.cdc.gov/drugoverdose to learn about the risks of opioid abuse and overdose. · If you believe you may be struggling with addiction, tell your health care provider and ask for guidance or call Joanna Castillo at [phone]. Discharge Instructions Learning About Blood Transfusions What is a blood transfusion? Blood transfusion is a medical treatment to replace the blood or parts of blood that your body has lost. The blood goes through a tube from a bag to an intravenous (IV) catheter and into your vein. You may need a blood transfusion after losing blood from an injury, a major surgery, an illness that causes bleeding, or an illness that destroys blood cells. Transfusions are also used to give you the parts of blood-such as platelets, plasma, or substances that cause clotting-that your body needs to fight an illness or stop bleeding. How is a blood transfusion done? Before you receive a blood transfusion, your blood is tested to find out what your blood type is. Blood or blood parts that are a match with your blood type are ordered by your doctor. Blood is typed as A, B, AB, or O. It is also typed as Rh-positive or Rh-negative. Your blood is also screened to look for antibodies that might react with the blood that is given to you. The blood you are getting is checked and rechecked to make sure that it's the right type for you. A sample of your blood is mixed with a sample of the blood you will receive to check for problems. Before actually giving you the transfusion, a doctor and nurses will look at the label on the package of blood and compare it to your hospital ID bracelet and medical records. The transfusion begins only when all agree that this is the correct blood and that you are the correct person to receive it. To receive the transfusion, you will have an intravenous (IV) catheter inserted into a vein.  A tube connects the catheter to the bag containing the blood, which is placed higher than your body. The blood then flows slowly into your vein. A doctor or nurse will check you several times during the transfusion to watch for a reaction or other problems. What are the possible risks? Blood transfusions have many benefits and are often life-saving. But they also have a few risks. Possible risks include: 
· Your body's reaction to receiving new blood. This may include: ¨ Fever. ¨ Allergic reactions. ¨ Breathing problems. · An infection from the blood. This risk is small because of the strict rules placed on handling and storing blood. Getting a viral infection, such as HIV or hepatitis B or C, through blood transfusions has become very rare. The U.S. Food and Drug Administration (FDA) enforces strict guidelines on the collection, testing, storage, and use of blood. · Getting the wrong blood type by accident. Severe reactions, which can be life-threatening, are very rare. What can you expect after a blood transfusion? Here are some things you can do at home to help prevent infection at the transfusion site: 
· Wash the area daily with warm, soapy water, and pat it dry. Don't use hydrogen peroxide or alcohol, which can slow healing. You may cover the area with a gauze bandage if it weeps or rubs against clothing. Change the bandage every day. · Keep the area clean and dry. When should you call for help? Call 911 anytime you think you may need emergency care. For example, call if: 
· You have severe trouble breathing. Call your doctor now or seek immediate medical care if: 
· You have a fever. · You feel weaker or more tired than usual. 
· You have a yellow tint to your skin or the whites of your eyes. Watch closely for changes in your health, and be sure to contact your doctor if you have any problems. Follow-up care is a key part of your treatment and safety.  Be sure to make and go to all appointments, and call your doctor if you are having problems. It's also a good idea to know your test results and keep a list of the medicines you take. Where can you learn more? Go to http://kennedy-orly.info/. Enter V588 in the search box to learn more about \"Learning About Blood Transfusions. \" Current as of: October 13, 2016 Content Version: 11.4 © 1149-6485 [employer]. Care instructions adapted under license by [employer] (which disclaims liability or warranty for this information). If you have questions about a medical condition or this instruction, always ask your healthcare professional. [place] [address] any warranty or liability for your use of this information. Anemia: Care Instructions Your Care Instructions Anemia is a low level of red blood cells, which carry oxygen throughout your body. Many things can cause anemia. Lack of iron is one of the most common causes. Your body needs iron to make hemoglobin, a substance in red blood cells that carries oxygen from the lungs to your body's cells. Without enough iron, the body produces fewer and smaller red blood cells. As a result, your body's cells do not get enough oxygen, and you feel tired and weak. And you may have trouble concentrating. Bleeding is the most common cause of a lack of iron. You may have heavy menstrual bleeding or bleeding caused by conditions such as ulcers, hemorrhoids, or cancer. Regular use of aspirin or other anti-inflammatory medicines (such as ibuprofen) also can cause bleeding in some people. A lack of iron in your diet also can cause anemia, especially at times when the body needs more iron, such as during pregnancy, infancy, and the teen years. Your doctor may have prescribed iron pills. It may take several months of treatment for your iron levels to return to normal. Your doctor also may suggest that you eat foods that are rich in iron, such as meat and beans. There are many other causes of anemia. It is not always due to a lack of iron. Finding the specific cause of your anemia will help your doctor find the right treatment for you. Follow-up care is a key part of your treatment and safety. Be sure to make and go to all appointments, and call your doctor if you are having problems. It's also a good idea to know your test results and keep a list of the medicines you take. How can you care for yourself at home? · Take your medicines exactly as prescribed. Call your doctor if you think you are having a problem with your medicine. · If your doctor recommends iron pills, take them as directed: ¨ Try to take the pills on an empty stomach about 1 hour before or 2 hours after meals. But you may need to take iron with food to avoid an upset stomach. ¨ Do not take antacids or drink milk or caffeine drinks (such as coffee, tea, or cola) at the same time or within 2 hours of the time that you take your iron. They can make it hard for your body to absorb the iron. ¨ Vitamin C (from food or supplements) helps your body absorb iron. Try taking iron pills with a glass of orange juice or some other food that is high in vitamin C, such as citrus fruits. ¨ Iron pills may cause stomach problems, such as heartburn, nausea, diarrhea, constipation, and cramps. Be sure to drink plenty of fluids, and include fruits, vegetables, and fiber in your diet each day. Iron pills often make your bowel movements dark or green. ¨ If you forget to take an iron pill, do not take a double dose of iron the next time you take a pill. ¨ Keep iron pills out of the reach of small children. An overdose of iron can be very dangerous. · Follow your doctor's advice about eating iron-rich foods. These include red meat, shellfish, poultry, eggs, beans, raisins, whole-grain bread, and leafy green vegetables. · Steam vegetables to help them keep their iron content. When should you call for help? Call 911 anytime you think you may need emergency care. For example, call if: 
? · You have symptoms of a heart attack. These may include: ¨ Chest pain or pressure, or a strange feeling in the chest. 
¨ Sweating. ¨ Shortness of breath. ¨ Nausea or vomiting. ¨ Pain, pressure, or a strange feeling in the back, neck, jaw, or upper belly or in one or both shoulders or arms. ¨ Lightheadedness or sudden weakness. ¨ A fast or irregular heartbeat. After you call 911, the  may tell you to chew 1 adult-strength or 2 to 4 low-dose aspirin. Wait for an ambulance. Do not try to drive yourself. ? · You passed out (lost consciousness). ?Call your doctor now or seek immediate medical care if: 
? · You have new or increased shortness of breath. ? · You are dizzy or lightheaded, or you feel like you may faint. ? · Your fatigue and weakness continue or get worse. ? · You have any abnormal bleeding, such as: 
¨ Nosebleeds. ¨ Vaginal bleeding that is different (heavier, more frequent, at a different time of the month) than what you are used to. ¨ Bloody or black stools, or rectal bleeding. ¨ Bloody or pink urine. ? Watch closely for changes in your health, and be sure to contact your doctor if: 
? · You do not get better as expected. Where can you learn more? Go to http://kennedy-orly.info/. Enter R301 in the search box to learn more about \"Anemia: Care Instructions. \" Current as of: October 13, 2016 Content Version: 11.4 © 0337-6686 [employer]. Care instructions adapted under license by [employer] (which disclaims liability or warranty for this information). If you have questions about a medical condition or this instruction, always ask your healthcare professional. [place] [address] any warranty or liability for your use of this information. Abdominal Myomectomy: What to Expect at [facility] Your Recovery You can expect to feel better and stronger each day, although you may tire quickly and need pain medicine for a week or two. You may need about 4 to 6 weeks to fully recover. Do not lift anything heavy while you are recovering so that your incision and your belly muscles can heal. 
This care sheet gives you a general idea about how long it will take for you to recover. But each person recovers at a different pace. Follow the steps below to get better as quickly as possible. How can you care for yourself at home? Activity ? · Rest when you feel tired. Getting enough sleep will help you recover. ? · Try to walk each day. Start out by walking a little more than you did the day before. Bit by bit, increase the amount you walk. Walking boosts blood flow and helps prevent pneumonia and constipation. ? · For 4 to 6 weeks, avoid lifting anything that would make you strain. This may include a child, heavy grocery bags and milk containers, a heavy briefcase or backpack, cat litter or dog food bags, or a vacuum . ? · Avoid strenuous activities, such as biking, jogging, weightlifting, and aerobic exercise, for 4 to 6 weeks. ? · You may shower. Pat the incision dry when you are done. Do not take a bath for the first week after surgery or until your doctor tells you it is okay. ? · You may have some light vaginal bleeding. Wear sanitary pads if needed. Do not douche or use tampons. ? · Ask your doctor when you can drive again. ? · You will probably need to take 2 to 4 weeks off work. It depends on the type of work you do and how you feel. ? · Do not have sex until your doctor tells you it is okay. ? · Talk about birth control with your doctor. Do not try to become pregnant until your doctor says it is okay. Diet ? · You can eat your normal diet. If your stomach is upset, try bland, low-fat foods like plain rice, broiled chicken, toast, and yogurt. ? · Drink plenty of fluids (unless your doctor tells you not to). ? · You may notice that your bowel movements are not regular right after your surgery. This is common. Try to avoid constipation and straining with bowel movements. You may want to take a fiber supplement every day. If you have not had a bowel movement after a couple of days, ask your doctor about taking a mild laxative. Medicines ? · Your doctor will tell you if and when you can restart your medicines. He or she will also give you instructions about taking any new medicines. ? · If you take blood thinners, such as warfarin (Coumadin), clopidogrel (Plavix), or aspirin, be sure to talk to your doctor. He or she will tell you if and when to start taking those medicines again. Make sure that you understand exactly what your doctor wants you to do. ? · Take pain medicines exactly as directed. ¨ If the doctor gave you a prescription medicine for pain, take it as prescribed. ¨ If you are not taking a prescription pain medicine, take an over-the-counter medicine such as acetaminophen (Tylenol), ibuprofen (Advil, Motrin), or naproxen (Aleve). Read and follow all instructions on the label. Do not take two or more pain medicines at the same time unless the doctor told you to. Many pain medicines contain acetaminophen, which is Tylenol. Too much acetaminophen (Tylenol) can be harmful. ? · If you think your pain medicine is making you sick to your stomach: 
¨ Take your medicine after meals (unless your doctor tells you not to). ¨ Ask your doctor for a different pain medicine. ? · If your doctor prescribed antibiotics, take them as directed. Do not stop taking them just because you feel better. You need to take the full course of antibiotics. Incision care ? · If you have strips of tape on the cut (incision) the doctor made, leave the tape on for a week or until it falls off.  
? · Wash the area daily with warm, soapy water and pat it dry. ? · Keep the area clean and dry. You may cover it with a gauze bandage if it weeps or rubs against clothing. Change the bandage every day. Other instructions ? · Wear loose, comfortable clothing and avoid anything that puts pressure on your belly for a few weeks. Follow-up care is a key part of your treatment and safety. Be sure to make and go to all appointments, and call your doctor if you are having problems. It's also a good idea to know your test results and keep a list of the medicines you take. When should you call for help? Call 911 anytime you think you may need emergency care. For example, call if: 
? · You passed out (lost consciousness). ? · You have chest pain, are short of breath, or cough up blood. ?Call your doctor now or seek immediate medical care if: 
? · You have pain that does not get better after you take pain medicine. ? · You cannot pass stools or gas. ? · You have vaginal discharge that has increased in amount or smells bad.  
? · You are sick to your stomach or cannot drink fluids. ? · You have loose stitches, or your incision comes open. ? · Bright red blood has soaked through the bandage over your incision. ? · You have signs of infection, such as: 
¨ Increased pain, swelling, warmth, or redness. ¨ Red streaks leading from the incision. ¨ Pus draining from the incision. ¨ A fever. ? · You have bright red vaginal bleeding that soaks one or more pads in an hour, or you have large clots. ? · You have signs of a blood clot in your leg (called a deep vein thrombosis), such as: 
¨ Pain in your calf, back of the knee, thigh, or groin. ¨ Redness and swelling in your leg or groin. ? Watch closely for any changes in your health, and be sure to contact your doctor if you have any problems. Where can you learn more? Go to http://kennedy-orly.info/. Enter E175 in the search box to learn more about \"Abdominal Myomectomy: What to Expect at Home. \" 
 Current as of: October 13, 2016 Content Version: 11.4 © 1441-3794 [employer]. Care instructions adapted under license by [employer] (which disclaims liability or warranty for this information). If you have questions about a medical condition or this instruction, always ask your healthcare professional. [address] any warranty or liability for your use of this information. General [employer] Announcement We are excited to announce that we are making your provider's discharge notes available to you in General Fusion. You will see these notes when they are completed and signed by the physician that discharged you from your recent hospital stay. If you have any questions or concerns about any information you see in General Fusion, please call the Health Information Department where you were seen or reach out to your Primary Care Provider for more information about your plan of care. Introducing [facility]! [facility] introduces General Fusion patient portal. Now you can access parts of your medical record, email your doctor's office, and request medication refills online. 1. In your internet browser, go to [email or web address] 2. Click on the First Time User? Click Here link in the Sign In box. You will see the New Member Sign Up page. 3. Enter your General [employer] Access Code exactly as it appears below. You will not need to use this code after youve completed the sign-up process. If you do not sign up before the expiration date, you must request a new code. · General Fusion Access Code: [email or web address] Expires: 9/20/2018  8:30 AM 
 
4. Enter the last four digits of your Social Security Number (xxxx) and Date of Birth (mm/dd/yyyy) as indicated and click Submit. You will be taken to the next sign-up page. 5. Create a General Fusion ID. This will be your General Fusion login ID and cannot be changed, so think of one that is secure and easy to remember. 6. Create a [employer] password. You can change your password at any time. 7. Enter your Password Reset Question and Answer. This can be used at a later time if you forget your password. 8. Enter your e-mail address. You will receive e-mail notification when new information is available in [address] 9. Click Sign Up. You can now view and download portions of your medical record. 10. Click the Download Summary menu link to download a portable copy of your medical information. If you have questions, please visit the Frequently Asked Questions section of the [employer] website. Remember, [employer] is NOT to be used for urgent needs. For medical emergencies, dial 911. Now available from your iPhone and Android! Introducing Brigido Razo As a [facility] patient, I wanted to make you aware of our electronic visit tool called Brigido Razo. [employer] allows you to connect within minutes with a medical provider 24 hours a day, seven days a week via a mobile device or tablet or logging into a secure website from your computer. You can access Brigido Razo from anywhere in the [place]. A virtual visit might be right for you when you have a simple condition and feel like you just dont want to get out of bed, or cant get away from work for an appointment, when your regular [facility] provider is not available (evenings, weekends or holidays), or when youre out of town and need minor care. Electronic visits cost only $49 and if the [employer] provider determines a prescription is needed to treat your condition, one can be electronically transmitted to a nearby pharmacy*. Please take a moment to enroll today if you have not already done so. The enrollment process is free and takes just a few minutes. To enroll, please download the [employer] regulo to your tablet or phone, or visit [email or web address] to enroll on your computer. And, as an [address] [place] [address] patient with a [employer] account, the results of your visits will be scanned into your electronic medical record and your primary care provider will be able to view the scanned results. We urge you to continue to see your regular [employer] provider for your ongoing medical care. And while your primary care provider may not be the one available when you seek a [employer] virtual visit, the peace of mind you get from getting a real diagnosis real time can be priceless. For more information on [employer], view our Frequently Asked Questions (FAQs) at [email or web address]. Sincerely, 
 
Adriane Orellana MD 
Chief Medical Officer [facility] Ivory Healy *:  certain medications cannot be prescribed via Canvas Providers Seen During Your Hospitalization Provider Specialty Primary office phone Tha Dove MD Emergency Medicine [phone] Ana María Dent MD Obstetrics & Gynecology [phone] Your Primary Care Physician (PCP) Primary Care Physician Office Phone Office Fax OTHER, PHYS ** None ** ** None ** You are allergic to the following Allergen Reactions Latex Rash Recent Documentation Height Weight BMI OB Status Smoking Status 1.778 m 80.2 kg 25.37 kg/m2 Unknown Never Smoker Emergency Contacts Name Discharge Info Relation Home Work Mobile Connie Castro CAREGIVER [3] Mother [14] [phone] Patient Belongings The following personal items are in your possession at time of discharge: 
  Dental Appliances: None  Visual Aid: Glasses, With patient      Home Medications: Sent home   Jewelry: None  Clothing: With patient    Other Valuables: Eyeglasses, Cell Phone, At bedside Please provide this summary of care documentation to your next provider. Signatures-by signing, you are acknowledging that this After Visit Summary has been reviewed with you and you have received a copy. Patient Signature:  ____________________________________________________________ Date:  ____________________________________________________________  
  
Suzon Mems Provider Signature:  ____________________________________________________________ Date:  ____________________________________________________________

## 2018-07-02 NOTE — ED TRIAGE NOTES
Pt c/o headache and sob onset Thursday which as gotten worse. Pt had myomectomy on Thursday, d/c Saturday.  Surgeon referred pt to ED

## 2018-07-03 VITALS
HEIGHT: 70 IN | SYSTOLIC BLOOD PRESSURE: 134 MMHG | OXYGEN SATURATION: 100 % | DIASTOLIC BLOOD PRESSURE: 87 MMHG | RESPIRATION RATE: 16 BRPM | WEIGHT: 176.8 LBS | TEMPERATURE: 98 F | HEART RATE: 89 BPM | BODY MASS INDEX: 25.31 KG/M2

## 2018-07-03 LAB
ERYTHROCYTE [DISTWIDTH] IN BLOOD BY AUTOMATED COUNT: 16.4 % (ref 11.5–14.5)
HCT VFR BLD AUTO: 25.3 % (ref 35–47)
HGB BLD-MCNC: 8.3 G/DL (ref 11.5–16)
MCH RBC QN AUTO: 29.1 PG (ref 26–34)
MCHC RBC AUTO-ENTMCNC: 32.8 G/DL (ref 30–36.5)
MCV RBC AUTO: 88.8 FL (ref 80–99)
NRBC # BLD: 0.03 K/UL (ref 0–0.01)
NRBC BLD-RTO: 0.5 PER 100 WBC
PLATELET # BLD AUTO: 291 K/UL (ref 150–400)
PMV BLD AUTO: 10.6 FL (ref 8.9–12.9)
RBC # BLD AUTO: 2.85 M/UL (ref 3.8–5.2)
WBC # BLD AUTO: 6.2 K/UL (ref 3.6–11)

## 2018-07-03 PROCEDURE — 74011250636 HC RX REV CODE- 250/636: Performed by: OBSTETRICS & GYNECOLOGY

## 2018-07-03 PROCEDURE — 36430 TRANSFUSION BLD/BLD COMPNT: CPT

## 2018-07-03 PROCEDURE — P9016 RBC LEUKOCYTES REDUCED: HCPCS | Performed by: PHYSICIAN ASSISTANT

## 2018-07-03 PROCEDURE — 85027 COMPLETE CBC AUTOMATED: CPT | Performed by: OBSTETRICS & GYNECOLOGY

## 2018-07-03 PROCEDURE — 99218 HC RM OBSERVATION: CPT

## 2018-07-03 PROCEDURE — 36415 COLL VENOUS BLD VENIPUNCTURE: CPT | Performed by: OBSTETRICS & GYNECOLOGY

## 2018-07-03 PROCEDURE — 96361 HYDRATE IV INFUSION ADD-ON: CPT

## 2018-07-03 PROCEDURE — 74011250637 HC RX REV CODE- 250/637: Performed by: OBSTETRICS & GYNECOLOGY

## 2018-07-03 RX ADMIN — SODIUM CHLORIDE 125 ML/HR: 900 INJECTION, SOLUTION INTRAVENOUS at 04:31

## 2018-07-03 RX ADMIN — OXYCODONE HYDROCHLORIDE AND ACETAMINOPHEN 1 TABLET: 10; 325 TABLET ORAL at 02:04

## 2018-07-03 RX ADMIN — OXYCODONE HYDROCHLORIDE AND ACETAMINOPHEN 1 TABLET: 10; 325 TABLET ORAL at 07:24

## 2018-07-03 RX ADMIN — IBUPROFEN 600 MG: 600 TABLET ORAL at 12:15

## 2018-07-03 NOTE — ROUTINE PROCESS
TRANSFER - IN REPORT:    Verbal report received from Yaz Mills (name) on Mukund Hodgkins  being received from ED(unit) for routine progression of care      Report consisted of patients Situation, Background, Assessment and   Recommendations(SBAR). Information from the following report(s) SBAR, Kardex and ED Summary was reviewed with the receiving nurse. Opportunity for questions and clarification was provided. Assessment completed upon patients arrival to unit and care assumed.

## 2018-07-03 NOTE — ROUTINE PROCESS
Bedside shift change report given to Joni RN  (oncoming nurse) by Evan Sheets RN  (offgoing nurse). Report included the following information SBAR, Kardex, ED Summary and Recent Results.

## 2018-07-03 NOTE — PROGRESS NOTES
7/3/18; 10:45 -     Reason for Admission:   \"Low Blood Count\"; Anemia                   RRAT Score:          4           Plan for utilizing home health:      None                    Likelihood of Readmission:  Low                         Transition of Care Plan:             Patient identified pharmacy preference as [facility] on [address]. Patient is independent in ADLs, to include driving, with no DME. Patient utilizes [facility] [address] as PCP and was last seen 2-3 months ago. Patient lives alone in a 2 story home with 2nd floor bedroom, zero exterior steps, 12 interior steps. Patient will discharge to patient's parents' home, which is a 3 story home, staying in a 2nd floor bedroom, with zero exterior steps, 12-16 interior steps, in care of mother, father, and sister, with transport provided by mother. Discharge is pending hemoglobin labs to ensure that numbers have sufficiently increased.     CRM: Jamey Wray, MPH; Z: [phone]

## 2018-07-03 NOTE — DISCHARGE SUMMARY
Gynecology Discharge Summary     Patient ID:  Manoj Mckeon  [id number]  31 y.o.  1985    Admit date: 7/2/2018    Discharge date: 7/3/2018     Admission Diagnoses:   Patient Active Problem List   Diagnosis Code    Anemia D64.9    Fibroid D25.9       Discharge Diagnoses: There are no discharge diagnoses documented for the most recent discharge. Patient Active Problem List   Diagnosis Code    Anemia D64.9    Fibroid D25.9       Procedures for this admission: Labs, Imaging, Blood Transfusion    Hospital Course: Pt admitted from ER after presenting with severe headache and shortness of breath. Imaging ruled out PE or cranial injury. Blood count was consistent with significant postop anemia which was likely causing patient symptoms. She was transfused 2 units of PRBCs and her hemoglobin increased appropriately. The patient was feeling better after the transfusion. Of note pt is doing well status post an abdominal myomectomy and reports that her pain is improving daily. Disposition: Home or self care    Discharged Condition: stable            Patient Instructions:   Current Discharge Medication List      CONTINUE these medications which have NOT CHANGED    Details   ibuprofen (MOTRIN) 600 mg tablet Take 1 Tab by mouth every six (6) hours as needed for Pain. Qty: 30 Tab, Refills: 1      oxyCODONE-acetaminophen (PERCOCET) 5-325 mg per tablet Take 1 Tab by mouth every four (4) hours as needed. Max Daily Amount: 6 Tabs. Qty: 28 Tab, Refills: 0    Associated Diagnoses: Intramural and subserous leiomyoma of uterus      multivitamin (ONE A DAY) tablet Take 1 Tab by mouth Every Tues, Thur & Sat.      ferrous sulfate (IRON) 325 mg (65 mg iron) tablet Take  by mouth daily.          STOP taking these medications       norethindrone-ethinyl estradiol (JUNEL 1/20, 21,) 1-20 mg-mcg tab Comments:   Reason for Stopping:             Activity: Activity as tolerated, no driving while taking narcotics, nothing in vagina, no vigorous exercise. Diet: Regular Diet  Wound Care: Keep wound clean and dry    Follow-up with Dr Nakul Kiran in 1 week.     Signed:  Kareem Washburn MD  7/3/2018  11:44 AM

## 2018-07-03 NOTE — PROGRESS NOTES
Gynecology Progress Note    Lancenanette Julien    Assesment: POD#5 s/p abdominal myomectomy with symptomatic postop anemia    Problem # 1:  Anemia (non-screening) (ICD-285.9) (MCT42-U82.899)  POD4 s/p abdominal myomectomy which required 2u PRBCS intra-op due to EBL 1L, now with symptomatic anemia (hgb 6.7)  currently no evidence of active/acute bleeding and VS are stable  S/P 2U PRBC  Repeat Hb 8.3 --> appropriate increase in Hb.     Problem # 2:  Headache (ICD-784.0) (BKX84-Q24)  responded well to toradol/compazine in ED  suspect maybe related to anemia/dehydration  PO pain meds and prn compazine  Asymptomatic this AM.     Problem # 3:  Post op surgical follow up (ICD-V67.00) (VVD88-N12)  routine post-op care  PO pain meds  enc ambulation    Plan:   Discharge home. Continue Ibuprofen/percocet as she has been taking. Fe daily. Follow up 1 week for postop check in office. Subjective:   She is without significant complaints. Pain controlled on current medication. Voiding without difficulty. Patient is passing flatus. She is is tolerating her diet. Pt feeling much better today. HA resolved. Feels ready for discharge. Reports post op pain decreasing daily.      Orders/Charges: Medium    Vitals:  Visit Vitals    /87 (BP 1 Location: Right arm, BP Patient Position: Post activity)    Pulse 89    Temp 98 °F (36.7 °C)    Resp 16    Ht 5' 10\" (1.778 m)    Wt 80.2 kg (176 lb 12.8 oz)    LMP 06/10/2018 (Exact Date)    SpO2 100%    BMI 25.37 kg/m2     Temp (24hrs), Av.1 °F (36.7 °C), Min:97 °F (36.1 °C), Max:98.9 °F (37.2 °C)      Last 24hr Input/Output:    Intake/Output Summary (Last 24 hours) at 18 1320  Last data filed at 18 1215   Gross per 24 hour   Intake          2339.17 ml   Output                0 ml   Net          2339.17 ml          Exam:  General: alert, cooperative, no distress, appears stated age     Lung: no respiratory difficulty noted     Heart: no cyanosis, clubbing, or edema     Abdomen: abdomen is soft without significant tenderness, masses, organomegaly or guarding     Extremities: extremities normal, atraumatic, no cyanosis or edema      Labs: Lab Results   Component Value Date/Time    WBC 6.2 07/03/2018 10:23 AM    WBC 8.5 07/02/2018 03:35 PM    WBC 17.4 (H) 06/29/2018 05:08 AM    WBC 15.6 (H) 06/28/2018 08:27 PM    WBC 3.5 (L) 06/22/2018 09:09 AM    HGB 8.3 (L) 07/03/2018 10:23 AM    HGB 6.7 (L) 07/02/2018 03:35 PM    HGB 7.9 (L) 06/29/2018 05:08 AM    HGB 9.1 (L) 06/28/2018 08:27 PM    HGB 10.5 (L) 06/22/2018 09:09 AM    HCT 25.3 (L) 07/03/2018 10:23 AM    HCT 21.6 (L) 07/02/2018 03:35 PM    HCT 24.4 (L) 06/29/2018 05:08 AM    HCT 28.6 (L) 06/28/2018 08:27 PM    HCT 34.2 (L) 06/22/2018 09:09 AM    PLATELET 709 70/40/6532 10:23 AM    PLATELET 474 37/08/4145 03:35 PM    PLATELET 845 87/37/8119 05:08 AM    PLATELET 576 62/02/7295 08:27 PM    PLATELET 138 56/94/1954 09:09 AM       Recent Results (from the past 24 hour(s))   CBC WITH AUTOMATED DIFF    Collection Time: 07/02/18  3:35 PM   Result Value Ref Range    WBC 8.5 3.6 - 11.0 K/uL    RBC 2.38 (L) 3.80 - 5.20 M/uL    HGB 6.7 (L) 11.5 - 16.0 g/dL    HCT 21.6 (L) 35.0 - 47.0 %    MCV 90.8 80.0 - 99.0 FL    MCH 28.2 26.0 - 34.0 PG    MCHC 31.0 30.0 - 36.5 g/dL    RDW 17.8 (H) 11.5 - 14.5 %    PLATELET 305 910 - 886 K/uL    MPV 11.7 8.9 - 12.9 FL    NRBC 0.7 (H) 0  WBC    ABSOLUTE NRBC 0.06 (H) 0.00 - 0.01 K/uL    NEUTROPHILS 73 32 - 75 %    LYMPHOCYTES 18 12 - 49 %    MONOCYTES 8 5 - 13 %    EOSINOPHILS 0 0 - 7 %    BASOPHILS 0 0 - 1 %    IMMATURE GRANULOCYTES 1 (H) 0.0 - 0.5 %    ABS. NEUTROPHILS 6.2 1.8 - 8.0 K/UL    ABS. LYMPHOCYTES 1.5 0.8 - 3.5 K/UL    ABS. MONOCYTES 0.7 0.0 - 1.0 K/UL    ABS. EOSINOPHILS 0.0 0.0 - 0.4 K/UL    ABS. BASOPHILS 0.0 0.0 - 0.1 K/UL    ABS. IMM.  GRANS. 0.1 (H) 0.00 - 0.04 K/UL    DF SMEAR SCANNED      RBC COMMENTS ANISOCYTOSIS  1+        RBC COMMENTS HYPOCHROMIA  1+       METABOLIC PANEL, COMPREHENSIVE    Collection Time: 07/02/18  3:35 PM   Result Value Ref Range    Sodium 139 136 - 145 mmol/L    Potassium 3.6 3.5 - 5.1 mmol/L    Chloride 104 97 - 108 mmol/L    CO2 28 21 - 32 mmol/L    Anion gap 7 5 - 15 mmol/L    Glucose 90 65 - 100 mg/dL    BUN 7 6 - 20 MG/DL    Creatinine 0.64 0.55 - 1.02 MG/DL    BUN/Creatinine ratio 11 (L) 12 - 20      GFR est AA >60 >60 ml/min/1.73m2    GFR est non-AA >60 >60 ml/min/1.73m2    Calcium 8.5 8.5 - 10.1 MG/DL    Bilirubin, total 0.7 0.2 - 1.0 MG/DL    ALT (SGPT) 33 12 - 78 U/L    AST (SGOT) 47 (H) 15 - 37 U/L    Alk.  phosphatase 148 (H) 45 - 117 U/L    Protein, total 7.3 6.4 - 8.2 g/dL    Albumin 2.6 (L) 3.5 - 5.0 g/dL    Globulin 4.7 (H) 2.0 - 4.0 g/dL    A-G Ratio 0.6 (L) 1.1 - 2.2     LIPASE    Collection Time: 07/02/18  3:35 PM   Result Value Ref Range    Lipase 54 (L) 73 - 393 U/L   URINALYSIS W/MICROSCOPIC    Collection Time: 07/02/18  3:35 PM   Result Value Ref Range    Color YELLOW/STRAW      Appearance CLEAR CLEAR      Specific gravity 1.009 1.003 - 1.030      pH (UA) 6.0 5.0 - 8.0      Protein NEGATIVE  NEG mg/dL    Glucose NEGATIVE  NEG mg/dL    Ketone NEGATIVE  NEG mg/dL    Bilirubin NEGATIVE  NEG      Blood LARGE (A) NEG      Urobilinogen 0.2 0.2 - 1.0 EU/dL    Nitrites NEGATIVE  NEG      Leukocyte Esterase SMALL (A) NEG      WBC 10-20 0 - 4 /hpf    RBC >100 (H) 0 - 5 /hpf    Epithelial cells FEW FEW /lpf    Bacteria NEGATIVE  NEG /hpf    Hyaline cast 0-2 0 - 5 /lpf   HCG URINE, QL. - POC    Collection Time: 07/02/18  3:49 PM   Result Value Ref Range    Pregnancy test,urine (POC) NEGATIVE  NEG     TYPE + CROSSMATCH    Collection Time: 07/02/18  6:55 PM   Result Value Ref Range    Crossmatch Expiration 07/05/2018     ABO/Rh(D) O NEGATIVE     Antibody screen NEG     Unit number [id number]     Blood component type  LR     Unit division 00     Status of unit TRANSFUSED     Crossmatch result Compatible     Unit number [id number]     Blood component type RC LR     Unit division 00     Status of unit ISSUED     Crossmatch result Compatible    CBC W/O DIFF    Collection Time: 07/03/18 10:23 AM   Result Value Ref Range    WBC 6.2 3.6 - 11.0 K/uL    RBC 2.85 (L) 3.80 - 5.20 M/uL    HGB 8.3 (L) 11.5 - 16.0 g/dL    HCT 25.3 (L) 35.0 - 47.0 %    MCV 88.8 80.0 - 99.0 FL    MCH 29.1 26.0 - 34.0 PG    MCHC 32.8 30.0 - 36.5 g/dL    RDW 16.4 (H) 11.5 - 14.5 %    PLATELET 561 341 - 393 K/uL    MPV 10.6 8.9 - 12.9 FL    NRBC 0.5 (H) 0  WBC    ABSOLUTE NRBC 0.03 (H) 0.00 - 0.01 K/uL

## 2018-07-03 NOTE — PROGRESS NOTES
Spiritual Care Assessment/Progress Note  [facility]      NAME: Joseph Costa      MRN: [id number]  AGE: 26 y.o.  SEX: female  Restoration Affiliation: No preference   Language:      7/3/2018           Spiritual Assessment begun in [address] through conversation with:         []Patient        [] Family    [] Friend(s)        Reason for Consult: Initial/Spiritual assessment, patient floor     Spiritual beliefs: (Please include comment if needed)     [x] Identifies with a reynaldo tradition:         [] Supported by a reynaldo community:            [] Claims no spiritual orientation:           [] Seeking spiritual identity:                [] Adheres to an individual form of spirituality:           [] Not able to assess:                           Identified resources for coping:      [x] Prayer                               [] Music                  [] Guided Imagery     [x] Family/friends                 [] Pet visits     [] Devotional reading                         [] Unknown     [] Other:                                              Interventions offered during this visit: (See comments for more details)    Patient Interventions: Affirmation of emotions/emotional suffering, Catharsis/review of pertinent events in supportive environment, Initial/Spiritual assessment, patient floor, Prayer (assurance of)           Plan of Care:     [] Support spiritual and/or cultural needs    [] Support AMD and/or advance care planning process      [] Support grieving process   [] Coordinate Rites and/or Rituals    [] Coordination with community clergy   [x] No spiritual needs identified at this time   [] Detailed Plan of Care below (See Comments)  [] Make referral to Music Therapy  [] Make referral to Pet Therapy     [] Make referral to Addiction services  [] Make referral to [facility]  [] Make referral to Spiritual Care Partner  [] No future visits requested        [] Follow up visits as needed     Comments: Visited Ms Duane Boatman in room [phone] for initial spiritual assessment. Ms Duane Boatman was smiling, sitting up in bed and appeared in good spirits. Provided active listening as she shared that she was feeling much better than when originally admitted; stated that she was getting ready to be discharged. Ms Duane Boatman shared that she felt good about going home and that she had family that would be able to assist her upon discharge. Ms Duane Boatman denied having any specific needs/concerns at time of visit. Assured her, with her permission, to keep her in prayer. : Rev. Jozef Meyer.  Jeronimo Jensen; [facility], to contact [phone] Ángel Garcia call: [phone]

## 2018-07-03 NOTE — PROGRESS NOTES
Spiritual Care Partner Volunteer visited patient in room 307/01on 7.03.18. Documented by: : Rev. Chrissy Cormier.  Jordan Query; [facility], to contact [phone] Ángel Garcia call: [phone]

## 2018-07-03 NOTE — DISCHARGE INSTRUCTIONS
Learning About Blood Transfusions  What is a blood transfusion? Blood transfusion is a medical treatment to replace the blood or parts of blood that your body has lost. The blood goes through a tube from a bag to an intravenous (IV) catheter and into your vein. You may need a blood transfusion after losing blood from an injury, a major surgery, an illness that causes bleeding, or an illness that destroys blood cells. Transfusions are also used to give you the parts of blood-such as platelets, plasma, or substances that cause clotting-that your body needs to fight an illness or stop bleeding. How is a blood transfusion done? Before you receive a blood transfusion, your blood is tested to find out what your blood type is. Blood or blood parts that are a match with your blood type are ordered by your doctor. Blood is typed as A, B, AB, or O. It is also typed as Rh-positive or Rh-negative. Your blood is also screened to look for antibodies that might react with the blood that is given to you. The blood you are getting is checked and rechecked to make sure that it's the right type for you. A sample of your blood is mixed with a sample of the blood you will receive to check for problems. Before actually giving you the transfusion, a doctor and nurses will look at the label on the package of blood and compare it to your hospital ID bracelet and medical records. The transfusion begins only when all agree that this is the correct blood and that you are the correct person to receive it. To receive the transfusion, you will have an intravenous (IV) catheter inserted into a vein. A tube connects the catheter to the bag containing the blood, which is placed higher than your body. The blood then flows slowly into your vein. A doctor or nurse will check you several times during the transfusion to watch for a reaction or other problems. What are the possible risks?   Blood transfusions have many benefits and are often life-saving. But they also have a few risks. Possible risks include:  · Your body's reaction to receiving new blood. This may include:  ¨ Fever. ¨ Allergic reactions. ¨ Breathing problems. · An infection from the blood. This risk is small because of the strict rules placed on handling and storing blood. Getting a viral infection, such as HIV or hepatitis B or C, through blood transfusions has become very rare. The [place] Food and Drug Administration (FDA) enforces strict guidelines on the collection, testing, storage, and use of blood. · Getting the wrong blood type by accident. Severe reactions, which can be life-threatening, are very rare. What can you expect after a blood transfusion? Here are some things you can do at home to help prevent infection at the transfusion site:  · Wash the area daily with warm, soapy water, and pat it dry. Don't use hydrogen peroxide or alcohol, which can slow healing. You may cover the area with a gauze bandage if it weeps or rubs against clothing. Change the bandage every day. · Keep the area clean and dry. When should you call for help? Call 911 anytime you think you may need emergency care. For example, call if:  · You have severe trouble breathing. Call your doctor now or seek immediate medical care if:  · You have a fever. · You feel weaker or more tired than usual.  · You have a yellow tint to your skin or the whites of your eyes. Watch closely for changes in your health, and be sure to contact your doctor if you have any problems. Follow-up care is a key part of your treatment and safety. Be sure to make and go to all appointments, and call your doctor if you are having problems. It's also a good idea to know your test results and keep a list of the medicines you take. Where can you learn more? Go to http://kennedy-orly.info/. Enter V544 in the search box to learn more about \"Learning About Blood Transfusions. \"  Current as of: October 13, 2016  Content Version: 11.4  © 7577-6958 [employer]. Care instructions adapted under license by [employer] (which disclaims liability or warranty for this information). If you have questions about a medical condition or this instruction, always ask your healthcare professional. [address] any warranty or liability for your use of this information. Anemia: Care Instructions  Your Care Instructions    Anemia is a low level of red blood cells, which carry oxygen throughout your body. Many things can cause anemia. Lack of iron is one of the most common causes. Your body needs iron to make hemoglobin, a substance in red blood cells that carries oxygen from the lungs to your body's cells. Without enough iron, the body produces fewer and smaller red blood cells. As a result, your body's cells do not get enough oxygen, and you feel tired and weak. And you may have trouble concentrating. Bleeding is the most common cause of a lack of iron. You may have heavy menstrual bleeding or bleeding caused by conditions such as ulcers, hemorrhoids, or cancer. Regular use of aspirin or other anti-inflammatory medicines (such as ibuprofen) also can cause bleeding in some people. A lack of iron in your diet also can cause anemia, especially at times when the body needs more iron, such as during pregnancy, infancy, and the teen years. Your doctor may have prescribed iron pills. It may take several months of treatment for your iron levels to return to normal. Your doctor also may suggest that you eat foods that are rich in iron, such as meat and beans. There are many other causes of anemia. It is not always due to a lack of iron. Finding the specific cause of your anemia will help your doctor find the right treatment for you. Follow-up care is a key part of your treatment and safety. Be sure to make and go to all appointments, and call your doctor if you are having problems. It's also a good idea to know your test results and keep a list of the medicines you take. How can you care for yourself at home? · Take your medicines exactly as prescribed. Call your doctor if you think you are having a problem with your medicine. · If your doctor recommends iron pills, take them as directed:  ¨ Try to take the pills on an empty stomach about 1 hour before or 2 hours after meals. But you may need to take iron with food to avoid an upset stomach. ¨ Do not take antacids or drink milk or caffeine drinks (such as coffee, tea, or cola) at the same time or within 2 hours of the time that you take your iron. They can make it hard for your body to absorb the iron. ¨ Vitamin C (from food or supplements) helps your body absorb iron. Try taking iron pills with a glass of orange juice or some other food that is high in vitamin C, such as citrus fruits. ¨ Iron pills may cause stomach problems, such as heartburn, nausea, diarrhea, constipation, and cramps. Be sure to drink plenty of fluids, and include fruits, vegetables, and fiber in your diet each day. Iron pills often make your bowel movements dark or green. ¨ If you forget to take an iron pill, do not take a double dose of iron the next time you take a pill. ¨ Keep iron pills out of the reach of small children. An overdose of iron can be very dangerous. · Follow your doctor's advice about eating iron-rich foods. These include red meat, shellfish, poultry, eggs, beans, raisins, whole-grain bread, and leafy green vegetables. · Steam vegetables to help them keep their iron content. When should you call for help? Call 911 anytime you think you may need emergency care. For example, call if:  ? · You have symptoms of a heart attack. These may include:  ¨ Chest pain or pressure, or a strange feeling in the chest.  ¨ Sweating. ¨ Shortness of breath. ¨ Nausea or vomiting.   ¨ Pain, pressure, or a strange feeling in the back, neck, jaw, or upper belly or in one or both shoulders or arms. ¨ Lightheadedness or sudden weakness. ¨ A fast or irregular heartbeat. After you call 911, the  may tell you to chew 1 adult-strength or 2 to 4 low-dose aspirin. Wait for an ambulance. Do not try to drive yourself. ? · You passed out (lost consciousness). ?Call your doctor now or seek immediate medical care if:  ? · You have new or increased shortness of breath. ? · You are dizzy or lightheaded, or you feel like you may faint. ? · Your fatigue and weakness continue or get worse. ? · You have any abnormal bleeding, such as:  ¨ Nosebleeds. ¨ Vaginal bleeding that is different (heavier, more frequent, at a different time of the month) than what you are used to. ¨ Bloody or black stools, or rectal bleeding. ¨ Bloody or pink urine. ? Watch closely for changes in your health, and be sure to contact your doctor if:  ? · You do not get better as expected. Where can you learn more? Go to http://kennedy-orly.info/. Enter R301 in the search box to learn more about \"Anemia: Care Instructions. \"  Current as of: October 13, 2016  Content Version: 11.4  © 5160-5878 [employer]. Care instructions adapted under license by [employer] (which disclaims liability or warranty for this information). If you have questions about a medical condition or this instruction, always ask your healthcare professional. [place] [address] any warranty or liability for your use of this information. Abdominal Myomectomy: What to Expect at [address] can expect to feel better and stronger each day, although you may tire quickly and need pain medicine for a week or two. You may need about 4 to 6 weeks to fully recover. Do not lift anything heavy while you are recovering so that your incision and your belly muscles can heal.  This care sheet gives you a general idea about how long it will take for you to recover. But each person recovers at a different pace. Follow the steps below to get better as quickly as possible. How can you care for yourself at home? Activity  ? · Rest when you feel tired. Getting enough sleep will help you recover. ? · Try to walk each day. Start out by walking a little more than you did the day before. Bit by bit, increase the amount you walk. Walking boosts blood flow and helps prevent pneumonia and constipation. ? · For 4 to 6 weeks, avoid lifting anything that would make you strain. This may include a child, heavy grocery bags and milk containers, a heavy briefcase or backpack, cat litter or dog food bags, or a vacuum . ? · Avoid strenuous activities, such as biking, jogging, weightlifting, and aerobic exercise, for 4 to 6 weeks. ? · You may shower. Pat the incision dry when you are done. Do not take a bath for the first week after surgery or until your doctor tells you it is okay. ? · You may have some light vaginal bleeding. Wear sanitary pads if needed. Do not douche or use tampons. ? · Ask your doctor when you can drive again. ? · You will probably need to take 2 to 4 weeks off work. It depends on the type of work you do and how you feel. ? · Do not have sex until your doctor tells you it is okay. ? · Talk about birth control with your doctor. Do not try to become pregnant until your doctor says it is okay. Diet  ? · You can eat your normal diet. If your stomach is upset, try bland, low-fat foods like plain rice, broiled chicken, toast, and yogurt. ? · Drink plenty of fluids (unless your doctor tells you not to). ? · You may notice that your bowel movements are not regular right after your surgery. This is common. Try to avoid constipation and straining with bowel movements. You may want to take a fiber supplement every day. If you have not had a bowel movement after a couple of days, ask your doctor about taking a mild laxative. Medicines  ?  · Your doctor will tell you if and when you can restart your medicines. He or she will also give you instructions about taking any new medicines. ? · If you take blood thinners, such as warfarin (Coumadin), clopidogrel (Plavix), or aspirin, be sure to talk to your doctor. He or she will tell you if and when to start taking those medicines again. Make sure that you understand exactly what your doctor wants you to do. ? · Take pain medicines exactly as directed. ¨ If the doctor gave you a prescription medicine for pain, take it as prescribed. ¨ If you are not taking a prescription pain medicine, take an over-the-counter medicine such as acetaminophen (Tylenol), ibuprofen (Advil, Motrin), or naproxen (Aleve). Read and follow all instructions on the label. Do not take two or more pain medicines at the same time unless the doctor told you to. Many pain medicines contain acetaminophen, which is Tylenol. Too much acetaminophen (Tylenol) can be harmful. ? · If you think your pain medicine is making you sick to your stomach:  ¨ Take your medicine after meals (unless your doctor tells you not to). ¨ Ask your doctor for a different pain medicine. ? · If your doctor prescribed antibiotics, take them as directed. Do not stop taking them just because you feel better. You need to take the full course of antibiotics. Incision care  ? · If you have strips of tape on the cut (incision) the doctor made, leave the tape on for a week or until it falls off.   ? · Wash the area daily with warm, soapy water and pat it dry. ? · Keep the area clean and dry. You may cover it with a gauze bandage if it weeps or rubs against clothing. Change the bandage every day. Other instructions  ? · Wear loose, comfortable clothing and avoid anything that puts pressure on your belly for a few weeks. Follow-up care is a key part of your treatment and safety. Be sure to make and go to all appointments, and call your doctor if you are having problems. It's also a good idea to know your test results and keep a list of the medicines you take. When should you call for help? Call 911 anytime you think you may need emergency care. For example, call if:  ? · You passed out (lost consciousness). ? · You have chest pain, are short of breath, or cough up blood. ?Call your doctor now or seek immediate medical care if:  ? · You have pain that does not get better after you take pain medicine. ? · You cannot pass stools or gas. ? · You have vaginal discharge that has increased in amount or smells bad.   ? · You are sick to your stomach or cannot drink fluids. ? · You have loose stitches, or your incision comes open. ? · Bright red blood has soaked through the bandage over your incision. ? · You have signs of infection, such as:  ¨ Increased pain, swelling, warmth, or redness. ¨ Red streaks leading from the incision. ¨ Pus draining from the incision. ¨ A fever. ? · You have bright red vaginal bleeding that soaks one or more pads in an hour, or you have large clots. ? · You have signs of a blood clot in your leg (called a deep vein thrombosis), such as:  ¨ Pain in your calf, back of the knee, thigh, or groin. ¨ Redness and swelling in your leg or groin. ? Watch closely for any changes in your health, and be sure to contact your doctor if you have any problems. Where can you learn more? Go to http://kennedy-orly.info/. Enter K797 in the search box to learn more about \"Abdominal Myomectomy: What to Expect at Home. \"  Current as of: October 13, 2016  Content Version: 11.4  © 8367-2268 [employer]. Care instructions adapted under license by [employer] (which disclaims liability or warranty for this information).  If you have questions about a medical condition or this instruction, always ask your healthcare professional. [address] any warranty or liability for your use of this information.

## 2018-07-03 NOTE — PROGRESS NOTES
Bedside shift change report given to Rohini Doherty (oncoming nurse) by Nicola Saab RN (offgoing nurse). Report included the following information SBAR, OR Summary, Procedure Summary, Intake/Output and MAR.

## 2018-07-04 LAB
ABO + RH BLD: NORMAL
BLD PROD TYP BPU: NORMAL
BLD PROD TYP BPU: NORMAL
BLOOD GROUP ANTIBODIES SERPL: NORMAL
BPU ID: NORMAL
BPU ID: NORMAL
CROSSMATCH RESULT,%XM: NORMAL
CROSSMATCH RESULT,%XM: NORMAL
SPECIMEN EXP DATE BLD: NORMAL
STATUS OF UNIT,%ST: NORMAL
STATUS OF UNIT,%ST: NORMAL
UNIT DIVISION, %UDIV: 0
UNIT DIVISION, %UDIV: 0

## 2025-07-17 NOTE — PERIOP NOTES
DENISE IN POSTING MADE AWARE OF PATIENTS LATEX ALLERGY
Patient given surgical site infection FAQ handout and CHG wipes. Preop instructions reviewed and patient verbalizes understanding of instructions. Patient has been given the opportunity to ask additional questions.
Detail Level: Detailed
Detail Level: Simple

## (undated) DEVICE — KENDALL SCD EXPRESS SLEEVES, KNEE LENGTH, MEDIUM: Brand: KENDALL SCD

## (undated) DEVICE — HANDLE LT SNAP ON ULT DURABLE LENS FOR TRUMPF ALC DISPOSABLE

## (undated) DEVICE — STERILE POLYISOPRENE POWDER-FREE SURGICAL GLOVES WITH EMOLLIENT COATING: Brand: PROTEXIS

## (undated) DEVICE — SOLUTION IV 1000ML 0.9% SOD CHL

## (undated) DEVICE — INFECTION CONTROL KIT SYS

## (undated) DEVICE — DRAPE,T,LAPARO,TRANS,STERILE: Brand: MEDLINE

## (undated) DEVICE — SUTURE VCRL SZ 2-0 L27IN ABSRB UD L26MM SH 1/2 CIR J417H

## (undated) DEVICE — (D)PREP SKN CHLRAPRP APPL 26ML -- CONVERT TO ITEM 371833

## (undated) DEVICE — ASTOUND STANDARD SURGICAL GOWN, XL: Brand: CONVERTORS

## (undated) DEVICE — BARRIER TISS ADH ABSRB 3X4IN -- GYNECARE INTERCEED

## (undated) DEVICE — DEVON™ KNEE AND BODY STRAP 60" X 3" (1.5 M X 7.6 CM): Brand: DEVON

## (undated) DEVICE — APPLICATOR BNDG 1MM ADH PREMIERPRO EXOFIN

## (undated) DEVICE — CATH FOL TY IC BAG 16FR 2000ML -- CONVERT TO ITEM 363158

## (undated) DEVICE — TRAY PREP DRY W/ PREM GLV 2 APPL 6 SPNG 2 UNDPD 1 OVERWRAP

## (undated) DEVICE — SUTURE MCRYL 3-0 L27IN ABSRB VLT SH L26MM 1/2 CIR Y316H

## (undated) DEVICE — SOLUTION IRRIG 1000ML H2O STRL BLT

## (undated) DEVICE — SUTURE VCRL SZ 2-0 L36IN ABSRB UD L36MM CT-1 1/2 CIR J945H

## (undated) DEVICE — SUTURE VCRL SZ 3-0 L27IN ABSRB UD L26MM SH 1/2 CIR J416H

## (undated) DEVICE — SUTURE VCRL SZ 0 L27IN ABSRB UD L36MM CT-1 1/2 CIR J260H

## (undated) DEVICE — SUTURE VCRL 2-0 L27IN ABSRB CT BRAID COAT UD J275H

## (undated) DEVICE — PAD SANIT NPKN 4IN GRD

## (undated) DEVICE — TELFA NON-ADHERENT PADS PREPAK: Brand: TELFA

## (undated) DEVICE — SUTURE VCRL SZ 4-0 L27IN ABSRB UD L19MM PS-2 3/8 CIR PRIM J426H

## (undated) DEVICE — 1200 GUARD II KIT W/5MM TUBE W/O VAC TUBE: Brand: GUARDIAN

## (undated) DEVICE — SUTURE MCRYL SZ 0 L27IN ABSRB VLT CT-1 L36MM 1/2 CIR TAPR Y340H

## (undated) DEVICE — SURGICAL PROCEDURE PACK BASIN MAJ SET CUST NO CAUT

## (undated) DEVICE — BASIC PACK: Brand: CONVERTORS

## (undated) DEVICE — REM POLYHESIVE ADULT PATIENT RETURN ELECTRODE: Brand: VALLEYLAB

## (undated) DEVICE — ROCKER SWITCH PENCIL BLADE ELECTRODE, HOLSTER: Brand: EDGE

## (undated) DEVICE — SPONGE LAP 18X18IN STRL -- 5/PK

## (undated) DEVICE — TOWEL SURG W17XL27IN STD BLU COT NONFENESTRATED PREWASHED

## (undated) DEVICE — SUTURE VCRL SZ 1 L36IN ABSRB VLT L48MM CTX 1/2 CIR J371H

## (undated) DEVICE — SPONGE HEMOSTAT CELLULS 4X8IN -- SURGICEL

## (undated) DEVICE — SUTURE MCRYL SZ 3-0 L27IN ABSRB UD L19MM PS-2 3/8 CIR PRIM Y427H